# Patient Record
Sex: MALE | Race: WHITE | NOT HISPANIC OR LATINO | Employment: FULL TIME | URBAN - METROPOLITAN AREA
[De-identification: names, ages, dates, MRNs, and addresses within clinical notes are randomized per-mention and may not be internally consistent; named-entity substitution may affect disease eponyms.]

---

## 2017-07-26 ENCOUNTER — OFFICE VISIT - HEALTHEAST (OUTPATIENT)
Dept: FAMILY MEDICINE | Facility: CLINIC | Age: 22
End: 2017-07-26

## 2017-07-26 DIAGNOSIS — J02.0 STREP PHARYNGITIS: ICD-10-CM

## 2017-11-29 ENCOUNTER — RECORDS - HEALTHEAST (OUTPATIENT)
Dept: ADMINISTRATIVE | Facility: OTHER | Age: 22
End: 2017-11-29

## 2017-11-29 ENCOUNTER — OFFICE VISIT - HEALTHEAST (OUTPATIENT)
Dept: INTERNAL MEDICINE | Facility: CLINIC | Age: 22
End: 2017-11-29

## 2017-11-29 DIAGNOSIS — J98.4 MILD OBSTRUCTION OF PULMONARY AIRFLOW: ICD-10-CM

## 2017-11-29 DIAGNOSIS — R05.3 CHRONIC COUGH: ICD-10-CM

## 2017-11-29 ASSESSMENT — MIFFLIN-ST. JEOR: SCORE: 1882.77

## 2017-12-27 ENCOUNTER — OFFICE VISIT - HEALTHEAST (OUTPATIENT)
Dept: INTERNAL MEDICINE | Facility: CLINIC | Age: 22
End: 2017-12-27

## 2017-12-27 DIAGNOSIS — R05.3 CHRONIC COUGH: ICD-10-CM

## 2017-12-27 DIAGNOSIS — J45.30 MILD PERSISTENT ASTHMA, UNSPECIFIED WHETHER COMPLICATED: ICD-10-CM

## 2017-12-27 DIAGNOSIS — J98.4 MILD OBSTRUCTION OF PULMONARY AIRFLOW: ICD-10-CM

## 2018-01-27 ENCOUNTER — OFFICE VISIT - HEALTHEAST (OUTPATIENT)
Dept: FAMILY MEDICINE | Facility: CLINIC | Age: 23
End: 2018-01-27

## 2018-01-27 DIAGNOSIS — J02.0 STREP PHARYNGITIS: ICD-10-CM

## 2018-01-27 DIAGNOSIS — R30.0 DYSURIA: ICD-10-CM

## 2018-01-27 DIAGNOSIS — R07.0 THROAT PAIN: ICD-10-CM

## 2018-01-27 DIAGNOSIS — N39.0 UTI (URINARY TRACT INFECTION): ICD-10-CM

## 2018-01-27 LAB
ALBUMIN UR-MCNC: ABNORMAL MG/DL
APPEARANCE UR: CLEAR
BILIRUB UR QL STRIP: NEGATIVE
COLOR UR AUTO: YELLOW
DEPRECATED S PYO AG THROAT QL EIA: ABNORMAL
GLUCOSE UR STRIP-MCNC: NEGATIVE MG/DL
HGB UR QL STRIP: NEGATIVE
KETONES UR STRIP-MCNC: NEGATIVE MG/DL
LEUKOCYTE ESTERASE UR QL STRIP: NEGATIVE
NITRATE UR QL: NEGATIVE
PH UR STRIP: 6.5 [PH] (ref 5–8)
SP GR UR STRIP: >=1.03 (ref 1–1.03)
UROBILINOGEN UR STRIP-ACNC: ABNORMAL

## 2018-01-28 ENCOUNTER — COMMUNICATION - HEALTHEAST (OUTPATIENT)
Dept: FAMILY MEDICINE | Facility: CLINIC | Age: 23
End: 2018-01-28

## 2018-01-28 LAB — BACTERIA SPEC CULT: NO GROWTH

## 2018-03-14 ENCOUNTER — COMMUNICATION - HEALTHEAST (OUTPATIENT)
Dept: TELEHEALTH | Facility: CLINIC | Age: 23
End: 2018-03-14

## 2018-03-14 ENCOUNTER — OFFICE VISIT - HEALTHEAST (OUTPATIENT)
Dept: FAMILY MEDICINE | Facility: CLINIC | Age: 23
End: 2018-03-14

## 2018-03-14 DIAGNOSIS — R09.82 PND (POST-NASAL DRIP): ICD-10-CM

## 2018-03-14 DIAGNOSIS — R30.0 BURNING WITH URINATION: ICD-10-CM

## 2018-03-14 DIAGNOSIS — R10.31 RLQ ABDOMINAL PAIN: ICD-10-CM

## 2018-03-14 LAB
ALBUMIN SERPL-MCNC: 4 G/DL (ref 3.5–5)
ALBUMIN UR-MCNC: NEGATIVE MG/DL
ALP SERPL-CCNC: 51 U/L (ref 45–120)
ALT SERPL W P-5'-P-CCNC: 65 U/L (ref 0–45)
ANION GAP SERPL CALCULATED.3IONS-SCNC: 9 MMOL/L (ref 5–18)
APPEARANCE UR: CLEAR
AST SERPL W P-5'-P-CCNC: 29 U/L (ref 0–40)
BACTERIA #/AREA URNS HPF: ABNORMAL HPF
BASOPHILS # BLD AUTO: 0 THOU/UL (ref 0–0.2)
BASOPHILS NFR BLD AUTO: 1 % (ref 0–2)
BILIRUB SERPL-MCNC: 0.6 MG/DL (ref 0–1)
BILIRUB UR QL STRIP: NEGATIVE
BUN SERPL-MCNC: 17 MG/DL (ref 8–22)
CALCIUM SERPL-MCNC: 9.6 MG/DL (ref 8.5–10.5)
CHLORIDE BLD-SCNC: 102 MMOL/L (ref 98–107)
CO2 SERPL-SCNC: 28 MMOL/L (ref 22–31)
COLOR UR AUTO: YELLOW
CREAT SERPL-MCNC: 0.86 MG/DL (ref 0.7–1.3)
EOSINOPHIL # BLD AUTO: 0.3 THOU/UL (ref 0–0.4)
EOSINOPHIL NFR BLD AUTO: 8 % (ref 0–6)
ERYTHROCYTE [DISTWIDTH] IN BLOOD BY AUTOMATED COUNT: 11.1 % (ref 11–14.5)
GFR SERPL CREATININE-BSD FRML MDRD: >60 ML/MIN/1.73M2
GLUCOSE BLD-MCNC: 106 MG/DL (ref 70–125)
GLUCOSE UR STRIP-MCNC: NEGATIVE MG/DL
HCT VFR BLD AUTO: 41.2 % (ref 40–54)
HGB BLD-MCNC: 14.3 G/DL (ref 14–18)
HGB UR QL STRIP: NEGATIVE
KETONES UR STRIP-MCNC: NEGATIVE MG/DL
LEUKOCYTE ESTERASE UR QL STRIP: ABNORMAL
LYMPHOCYTES # BLD AUTO: 1.4 THOU/UL (ref 0.8–4.4)
LYMPHOCYTES NFR BLD AUTO: 39 % (ref 20–40)
MCH RBC QN AUTO: 31.5 PG (ref 27–34)
MCHC RBC AUTO-ENTMCNC: 34.8 G/DL (ref 32–36)
MCV RBC AUTO: 91 FL (ref 80–100)
MONOCYTES # BLD AUTO: 0.3 THOU/UL (ref 0–0.9)
MONOCYTES NFR BLD AUTO: 8 % (ref 2–10)
NEUTROPHILS # BLD AUTO: 1.6 THOU/UL (ref 2–7.7)
NEUTROPHILS NFR BLD AUTO: 45 % (ref 50–70)
NITRATE UR QL: NEGATIVE
PH UR STRIP: 6.5 [PH] (ref 5–8)
PLATELET # BLD AUTO: 196 THOU/UL (ref 140–440)
PMV BLD AUTO: 6.7 FL (ref 7–10)
POTASSIUM BLD-SCNC: 4.2 MMOL/L (ref 3.5–5)
PROT SERPL-MCNC: 7 G/DL (ref 6–8)
RBC # BLD AUTO: 4.55 MILL/UL (ref 4.4–6.2)
RBC #/AREA URNS AUTO: ABNORMAL HPF
SODIUM SERPL-SCNC: 139 MMOL/L (ref 136–145)
SP GR UR STRIP: 1.02 (ref 1–1.03)
SQUAMOUS #/AREA URNS AUTO: ABNORMAL LPF
UROBILINOGEN UR STRIP-ACNC: ABNORMAL
WBC #/AREA URNS AUTO: ABNORMAL HPF
WBC: 3.6 THOU/UL (ref 4–11)

## 2018-03-15 LAB — BACTERIA SPEC CULT: NO GROWTH

## 2018-03-19 ENCOUNTER — COMMUNICATION - HEALTHEAST (OUTPATIENT)
Dept: FAMILY MEDICINE | Facility: CLINIC | Age: 23
End: 2018-03-19

## 2018-03-19 ENCOUNTER — AMBULATORY - HEALTHEAST (OUTPATIENT)
Dept: FAMILY MEDICINE | Facility: CLINIC | Age: 23
End: 2018-03-19

## 2018-03-19 DIAGNOSIS — D72.819 LEUKOPENIA: ICD-10-CM

## 2018-04-02 ENCOUNTER — OFFICE VISIT - HEALTHEAST (OUTPATIENT)
Dept: OTOLARYNGOLOGY | Facility: CLINIC | Age: 23
End: 2018-04-02

## 2018-04-02 DIAGNOSIS — J31.0 CHRONIC RHINITIS, UNSPECIFIED TYPE: ICD-10-CM

## 2018-04-19 ENCOUNTER — OFFICE VISIT - HEALTHEAST (OUTPATIENT)
Dept: ALLERGY | Facility: CLINIC | Age: 23
End: 2018-04-19

## 2018-04-19 DIAGNOSIS — R09.81 NASAL CONGESTION: ICD-10-CM

## 2018-04-19 ASSESSMENT — MIFFLIN-ST. JEOR: SCORE: 1973.49

## 2018-12-05 ENCOUNTER — OFFICE VISIT - HEALTHEAST (OUTPATIENT)
Dept: FAMILY MEDICINE | Facility: CLINIC | Age: 23
End: 2018-12-05

## 2018-12-05 DIAGNOSIS — J06.9 VIRAL UPPER RESPIRATORY TRACT INFECTION: ICD-10-CM

## 2018-12-05 DIAGNOSIS — R19.7 DIARRHEA, UNSPECIFIED TYPE: ICD-10-CM

## 2018-12-05 DIAGNOSIS — R07.0 THROAT PAIN: ICD-10-CM

## 2018-12-05 LAB — DEPRECATED S PYO AG THROAT QL EIA: NORMAL

## 2018-12-06 LAB — GROUP A STREP BY PCR: NORMAL

## 2019-11-15 ENCOUNTER — OFFICE VISIT - HEALTHEAST (OUTPATIENT)
Dept: INTERNAL MEDICINE | Facility: CLINIC | Age: 24
End: 2019-11-15

## 2019-11-15 DIAGNOSIS — Z11.59 NEED FOR HEPATITIS C SCREENING TEST: ICD-10-CM

## 2019-11-15 DIAGNOSIS — R74.01 TRANSAMINITIS: ICD-10-CM

## 2019-11-15 DIAGNOSIS — R30.0 DYSURIA: ICD-10-CM

## 2019-11-15 DIAGNOSIS — Z11.4 SCREENING FOR HIV (HUMAN IMMUNODEFICIENCY VIRUS): ICD-10-CM

## 2019-11-15 DIAGNOSIS — Z13.220 SCREENING FOR HYPERLIPIDEMIA: ICD-10-CM

## 2019-11-15 DIAGNOSIS — Z00.00 ROUTINE GENERAL MEDICAL EXAMINATION AT A HEALTH CARE FACILITY: ICD-10-CM

## 2019-11-15 DIAGNOSIS — Z11.3 SCREEN FOR STD (SEXUALLY TRANSMITTED DISEASE): ICD-10-CM

## 2019-11-15 DIAGNOSIS — Z13.1 SCREENING FOR DIABETES MELLITUS: ICD-10-CM

## 2019-11-15 LAB
ALBUMIN SERPL-MCNC: 4.3 G/DL (ref 3.5–5)
ALBUMIN UR-MCNC: NEGATIVE MG/DL
ALP SERPL-CCNC: 56 U/L (ref 45–120)
ALT SERPL W P-5'-P-CCNC: 86 U/L (ref 0–45)
ANION GAP SERPL CALCULATED.3IONS-SCNC: 9 MMOL/L (ref 5–18)
APPEARANCE UR: CLEAR
AST SERPL W P-5'-P-CCNC: 109 U/L (ref 0–40)
BACTERIA #/AREA URNS HPF: ABNORMAL HPF
BILIRUB SERPL-MCNC: 0.4 MG/DL (ref 0–1)
BILIRUB UR QL STRIP: NEGATIVE
BUN SERPL-MCNC: 16 MG/DL (ref 8–22)
CALCIUM SERPL-MCNC: 9.7 MG/DL (ref 8.5–10.5)
CHLORIDE BLD-SCNC: 104 MMOL/L (ref 98–107)
CHOLEST SERPL-MCNC: 170 MG/DL
CO2 SERPL-SCNC: 27 MMOL/L (ref 22–31)
COLOR UR AUTO: YELLOW
CREAT SERPL-MCNC: 0.9 MG/DL (ref 0.7–1.3)
ERYTHROCYTE [DISTWIDTH] IN BLOOD BY AUTOMATED COUNT: 11.6 % (ref 11–14.5)
FASTING STATUS PATIENT QL REPORTED: YES
GFR SERPL CREATININE-BSD FRML MDRD: >60 ML/MIN/1.73M2
GLUCOSE BLD-MCNC: 86 MG/DL (ref 70–125)
GLUCOSE UR STRIP-MCNC: NEGATIVE MG/DL
HBA1C MFR BLD: 5.2 % (ref 3.5–6)
HCT VFR BLD AUTO: 42.6 % (ref 40–54)
HDLC SERPL-MCNC: 43 MG/DL
HGB BLD-MCNC: 14.4 G/DL (ref 14–18)
HGB UR QL STRIP: ABNORMAL
HIV 1+2 AB+HIV1 P24 AG SERPL QL IA: NEGATIVE
KETONES UR STRIP-MCNC: NEGATIVE MG/DL
LDLC SERPL CALC-MCNC: 102 MG/DL
LEUKOCYTE ESTERASE UR QL STRIP: NEGATIVE
MCH RBC QN AUTO: 30.7 PG (ref 27–34)
MCHC RBC AUTO-ENTMCNC: 33.8 G/DL (ref 32–36)
MCV RBC AUTO: 91 FL (ref 80–100)
NITRATE UR QL: NEGATIVE
PH UR STRIP: 6.5 [PH] (ref 5–8)
PLATELET # BLD AUTO: 233 THOU/UL (ref 140–440)
PMV BLD AUTO: 7.2 FL (ref 7–10)
POTASSIUM BLD-SCNC: 4.4 MMOL/L (ref 3.5–5)
PROT SERPL-MCNC: 7.4 G/DL (ref 6–8)
RBC # BLD AUTO: 4.69 MILL/UL (ref 4.4–6.2)
RBC #/AREA URNS AUTO: ABNORMAL HPF
SODIUM SERPL-SCNC: 140 MMOL/L (ref 136–145)
SP GR UR STRIP: 1.01 (ref 1–1.03)
SQUAMOUS #/AREA URNS AUTO: ABNORMAL LPF
TRIGL SERPL-MCNC: 127 MG/DL
UROBILINOGEN UR STRIP-ACNC: ABNORMAL
WBC #/AREA URNS AUTO: ABNORMAL HPF
WBC: 3.4 THOU/UL (ref 4–11)

## 2019-11-15 RX ORDER — CETIRIZINE HYDROCHLORIDE 10 MG/1
10 TABLET ORAL DAILY
Status: SHIPPED | COMMUNITY
Start: 2019-11-15

## 2019-11-15 ASSESSMENT — MIFFLIN-ST. JEOR: SCORE: 1909.99

## 2019-11-18 ENCOUNTER — COMMUNICATION - HEALTHEAST (OUTPATIENT)
Dept: LAB | Facility: CLINIC | Age: 24
End: 2019-11-18

## 2019-11-18 ENCOUNTER — COMMUNICATION - HEALTHEAST (OUTPATIENT)
Dept: INTERNAL MEDICINE | Facility: CLINIC | Age: 24
End: 2019-11-18

## 2019-11-18 DIAGNOSIS — Z00.00 ROUTINE GENERAL MEDICAL EXAMINATION AT A HEALTH CARE FACILITY: ICD-10-CM

## 2019-11-18 LAB
C TRACH DNA SPEC QL PROBE+SIG AMP: POSITIVE
HCV AB SERPL QL IA: NEGATIVE
N GONORRHOEA DNA SPEC QL NAA+PROBE: NEGATIVE

## 2019-11-26 ENCOUNTER — OFFICE VISIT - HEALTHEAST (OUTPATIENT)
Dept: INTERNAL MEDICINE | Facility: CLINIC | Age: 24
End: 2019-11-26

## 2019-11-26 DIAGNOSIS — A74.9 CHLAMYDIA INFECTION: ICD-10-CM

## 2019-11-26 DIAGNOSIS — Z78.9 ALCOHOL USE: ICD-10-CM

## 2019-11-26 DIAGNOSIS — R74.01 TRANSAMINITIS: ICD-10-CM

## 2019-11-26 ASSESSMENT — MIFFLIN-ST. JEOR: SCORE: 1905.46

## 2021-05-24 ENCOUNTER — RECORDS - HEALTHEAST (OUTPATIENT)
Dept: ADMINISTRATIVE | Facility: CLINIC | Age: 26
End: 2021-05-24

## 2021-05-25 ENCOUNTER — RECORDS - HEALTHEAST (OUTPATIENT)
Dept: ADMINISTRATIVE | Facility: CLINIC | Age: 26
End: 2021-05-25

## 2021-05-28 ASSESSMENT — ASTHMA QUESTIONNAIRES: ACT_TOTALSCORE: 25

## 2021-05-31 VITALS — BODY MASS INDEX: 27.02 KG/M2 | WEIGHT: 202 LBS

## 2021-05-31 VITALS — WEIGHT: 193.3 LBS | BODY MASS INDEX: 25.86 KG/M2

## 2021-05-31 VITALS — WEIGHT: 190 LBS | HEIGHT: 73 IN | BODY MASS INDEX: 25.18 KG/M2

## 2021-05-31 VITALS — BODY MASS INDEX: 25.44 KG/M2 | WEIGHT: 190.2 LBS

## 2021-06-01 VITALS — WEIGHT: 210 LBS | HEIGHT: 73 IN | BODY MASS INDEX: 27.83 KG/M2

## 2021-06-01 VITALS — WEIGHT: 206 LBS | BODY MASS INDEX: 27.55 KG/M2

## 2021-06-02 VITALS — WEIGHT: 204 LBS | BODY MASS INDEX: 27.29 KG/M2

## 2021-06-03 NOTE — PROGRESS NOTES
"  Office Visit - Follow Up   Agustín Conway   23 y.o. male    Date of Visit: 11/26/2019    Chief Complaint   Patient presents with     Lab Result Follow Up        Assessment and Plan   1. Chlamydia infection  Treated with azithromycin, now asymptomatic.  We discussed modes of transmission.  We discussed partners and alerting partners.  No recent sexual contacts.  Prior girlfriend, no contact for 6 months, she has had repeated negative chlamydial testing    2. Transaminitis  Likely related to alcohol.  I recommended that he limit alcohol to 4 5 drinks per week that is 100 g of alcohol per week and come back in the next 1 to 3 months for recheck of labs    3. Alcohol use  See above, if unable to cut back consider treatment options    Return in about 3 months (around 2/26/2020) for recheck.     History of Present Illness   This 23 y.o. old man comes in for follow-up.  Last visit he was complaining of some urethritis.  Positive for chlamydia.  Improved with azithromycin.  Comes in to discuss this.  Also had elevated liver test.  Reports that he had a lot of alcohol 2 nights prior to testing.  He frequently will have excessive alcohol.  He would like to cut back.  He thinks he is capable of doing this.    Review of Systems: A comprehensive review of systems was negative except as noted.     Medications, Allergies and Problem List   Reviewed, reconciled and updated  Post Discharge Medication Reconciliation Status:      Physical Exam   General Appearance:   No acute distress    /66 (Patient Site: Left Arm, Patient Position: Sitting, Cuff Size: Adult Regular)   Pulse 63   Ht 5' 10.5\" (1.791 m)   Wt 202 lb (91.6 kg)   SpO2 96%   BMI 28.57 kg/m        Cardiovascular regular rate and rhythm no murmur gallop or rub  Pulmonary lungs are clear to auscultation bilaterally  Gastrointestinal abdomen soft nontender nondistended no organomegaly  Neurologic exam is non focal  Psychiatric pleasant, no confusion or agitation    "     Additional Information   Current Outpatient Medications   Medication Sig Dispense Refill     cetirizine (ZYRTEC) 10 MG tablet Take 10 mg by mouth daily.       No current facility-administered medications for this visit.      No Known Allergies  Social History     Tobacco Use     Smoking status: Former Smoker     Smokeless tobacco: Former User   Substance Use Topics     Alcohol use: Yes     Alcohol/week: 3.0 standard drinks     Types: 3 Standard drinks or equivalent per week     Drug use: No       Review and/or order of clinical lab tests:  Review and/or order of radiology tests:  Review and/or order of medicine tests:  Discussion of test results with performing physician:  Decision to obtain old records and/or obtain history from someone other than the patient:  Review and summarization of old records and/or obtaining history from someone other than the patient and.or discussion of case with another health care provider:  Independent visualization of image, tracing or specimen itself:    Time:      Dick Flynn MD

## 2021-06-03 NOTE — PROGRESS NOTES
Office Visit - Physical   Agustín Conway   23 y.o.  male    Date of visit: 11/15/2019  Physician: Dick Flynn MD     Assessment and Plan   1. Routine general medical examination at a health care facility  Is a 23-year-old man with issues as discussed below.  Ongoing healthy lifestyle discussed and recommended    2. Screening for hyperlipidemia  - Lipid Cascade; Future  - Lipid Cascade    3. Screening for diabetes mellitus  - Glycosylated Hemoglobin A1c    4. Need for hepatitis C screening test  - Hepatitis C Antibody (Anti-HCV)    5. Screen for STD (sexually transmitted disease)  - Chlamydia trachomatis & Neisseria gonorrhoeae, Amplified Detection    6. Screening for HIV (human immunodeficiency virus)  - HIV Antigen/Antibody Screening Cascade    7. Dysuria  Symptoms would be suggestive of something like chlamydia.  Therefore empirically given him azithromycin 1 g.  Also check for gonorrhea.  Consider urology evaluation if testing unremarkable and symptoms persist  - Urinalysis-UC if Indicated  - azithromycin (ZITHROMAX) 500 MG tablet; Take 2 tablets (1,000 mg total) by mouth once for 1 dose.  Dispense: 2 tablet; Refill: 0    8. Transaminitis  Etiology uncertain.  Checking labs as above.  Additionally have stressed importance of low carbohydrate diet and minimization of alcohol.  - Comprehensive Metabolic Panel  - HM2(CBC w/o Differential)    Return in about 1 year (around 11/15/2020) for annual physical.     Chief Complaint   Chief Complaint   Patient presents with     Annual Exam        Patient Profile   Social History     Social History Narrative    Lives alone.  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          Past Medical History   Patient Active Problem List   Diagnosis     Seasonal allergic rhinitis       Past Surgical History  He has a past surgical history that includes Ankle surgery (Right); ORIF wrist fracture; and ORIF elbow fracture (Left).     History of Present Illness   This 23 y.o. old man  "comes in for annual physical as well as evaluation of a few concerns.  Overall fairly healthy.  Working for [employer].  Has a history of some dysuria.  He has been treated intermittently for possible urinary tract infections and has had some exposures to STDs and has been treated empirically for 6 or 7 infections.  It does not look like he is ever had any positive testing.  Over the last few weeks to months he is been having some pain with urination.  [place] when he urinates.  He has not noticed any blood or pus.  No swelling of the testicles or pain.  No fever chills.  No new sexual partners.  Last new sexual partner in June.  Had been using condoms.  Has female partners.  No history of male partners.  No history of intravenous drug use.  Previously had mild elevation in liver test.  Does endorse heavy alcohol use in the past currently states not drinking heavily.    Review of Systems: A comprehensive review of systems was negative except as noted.     Medications and Allergies   Current Outpatient Medications   Medication Sig Dispense Refill     cetirizine (ZYRTEC) 10 MG tablet Take 10 mg by mouth daily.       azithromycin (ZITHROMAX) 500 MG tablet Take 2 tablets (1,000 mg total) by mouth once for 1 dose. 2 tablet 0     No current facility-administered medications for this visit.      No Known Allergies     Family and Social History   Family History   Problem Relation Age of Onset     No Medical Problems Mother      No Medical Problems Father      Drug abuse Brother         Social History     Tobacco Use     Smoking status: Former Smoker     Smokeless tobacco: Former User   Substance Use Topics     Alcohol use: Yes     Alcohol/week: 3.0 standard drinks     Types: 3 Standard drinks or equivalent per week     Drug use: No        Physical Exam   General Appearance:   No acute distress    /62 (Patient Site: Right Arm, Patient Position: Sitting, Cuff Size: Adult Regular)   Pulse 90   Ht 5' 10.5\" (1.791 m) "   Wt 203 lb (92.1 kg)   SpO2 95%   BMI 28.72 kg/m      EYES: Eyelids, conjunctiva, and sclera were normal. Pupils were normal. Cornea, iris, and lens were normal bilaterally.  HEAD, EARS, NOSE, MOUTH, AND THROAT: Head and face were normal. Hearing was normal to voice and the ears were normal to external exam. Nose appearance was normal and there was no discharge. Oropharynx was normal.  NECK: Neck appearance was normal. There were no neck masses and the thyroid was not enlarged.  RESPIRATORY: Breathing pattern was normal and the chest moved symmetrically.  Percussion/auscultatory percussion was normal.  Lung sounds were normal and there were no abnormal sounds.  CARDIOVASCULAR: Heart rate and rhythm were normal.  S1 and S2 were normal and there were no extra sounds or murmurs. Peripheral pulses in arms and legs were normal.  Jugular venous pressure was normal.  There was no peripheral edema.  GASTROINTESTINAL: The abdomen was normal in contour.  Bowel sounds were present.  Percussion detected no organ enlargement or tenderness.  Palpation detected no tenderness, mass, or enlarged organs.   MUSCULOSKELETAL: Skeletal configuration was normal and muscle mass was normal for age. Joint appearance was overall normal.  LYMPHATIC: There were no enlarged nodes.  SKIN/HAIR/NAILS: Skin color was normal.  There were no skin lesions.  Hair and nails were normal.  NEUROLOGIC: The patient was alert and oriented to person, place, time, and circumstance. Speech was normal. Cranial nerves were normal. Motor strength was normal for age. The patient was normally coordinated.  PSYCHIATRIC:  Mood and affect were normal and the patient had normal recent and remote memory. The patient's judgment and insight were normal.       Additional Information        Dick Flynn MD  Internal Medicine  Contact me at [phone]

## 2021-06-04 VITALS
OXYGEN SATURATION: 95 % | HEIGHT: 71 IN | DIASTOLIC BLOOD PRESSURE: 62 MMHG | WEIGHT: 203 LBS | BODY MASS INDEX: 28.42 KG/M2 | SYSTOLIC BLOOD PRESSURE: 126 MMHG | HEART RATE: 90 BPM

## 2021-06-04 VITALS
HEIGHT: 71 IN | BODY MASS INDEX: 28.28 KG/M2 | SYSTOLIC BLOOD PRESSURE: 120 MMHG | WEIGHT: 202 LBS | HEART RATE: 63 BPM | OXYGEN SATURATION: 96 % | DIASTOLIC BLOOD PRESSURE: 66 MMHG

## 2021-06-12 NOTE — PROGRESS NOTES
SUBJECTIVE:   Agustín Conway is a 21 y.o. male patient comes in for evaluation of a sore throat for the last several days.  His girlfriend had strep 2 weeks ago and was treated, but now has a sore throat.  He has also had a intermittent cough for the last 2 months that slightly productive.  He has had a little bit of wheezing.  He has had an albuterol MDI in the past and would like a refill.    OBJECTIVE:   Appears mildly ill   Ears: normal  Eyes: no redness or discharge  Nose: no discharge  Oropharynx: moderate erythema  Neck: no adenopathy  Lungs: clear to auscultation, no wheezes or rales and unlabored breathing  Skin: no strep-like sandpaper rash.    Rapid Strep test is positive    ASSESSMENT: Streptococcal pharyngitis and slight cough for the last couple months. He says that he wheezes at times and lungs sounded clear this visit. I will refill the albuterol.    PLAN:     Medications Ordered   Medications     albuterol (PROAIR HFA;PROVENTIL HFA;VENTOLIN HFA) 90 mcg/actuation inhaler     Sig: Inhale 2 puffs every 6 (six) hours as needed for wheezing.     Dispense:  1 each     Refill:  1     May substitute the equivalent medication per insurance preference.     amoxicillin (AMOXIL) 875 MG tablet     Sig: Take 1 tablet (875 mg total) by mouth 2 (two) times a day for 10 days.     Dispense:  20 tablet     Refill:  0

## 2021-06-14 NOTE — PROGRESS NOTES
"[facility] Clinic Note  Patient Name: Agustín Conway  Patient Age: 21 y.o.  YOB: 1995  MRN: [id number]  ?  Date of Visit: 11/29/2017  Reason for Office Visit:   Chief Complaint   Patient presents with     Cough     x 6 months     Illness     cold     HPI: Agustín Conway 21 y.o. male who presents to clinic for cough x 6 mo, intermittent, productive, clear sputum. Recently has had a cold, with post nasal drip that has made it worse. Slight wheezing. He worked out the other day and had some wheezing post work out. No history of reflux. Previous smoker in college. Does not currently smoke. He is constantly having to clear throat. No chest pain, sob, monzon, fevers/chills, weight loss.     Review of Systems: As noted in HPI     Current Scheduled Meds:  Outpatient Encounter Prescriptions as of 11/29/2017   Medication Sig Dispense Refill     albuterol (PROAIR HFA;PROVENTIL HFA;VENTOLIN HFA) 90 mcg/actuation inhaler Inhale 2 puffs every 6 (six) hours as needed for wheezing. 1 each 1     fluticasone (FLONASE) 50 mcg/actuation nasal spray 1 spray into each nostril daily. 16 g 2     fluticasone (FLOVENT HFA) 110 mcg/actuation inhaler Inhale 1 puff 2 (two) times a day. 1 Inhaler 2     No facility-administered encounter medications on file as of 11/29/2017.        Objective / Physical Examination:  /68  Pulse 71  Temp 98.4  F (36.9  C) (Oral)   Ht 6' 0.5\" (1.842 m)  Wt 190 lb (86.2 kg)  SpO2 100%  BMI 25.41 kg/m2  Wt Readings from Last 3 Encounters:   11/29/17 190 lb (86.2 kg)   07/26/17 190 lb 3.2 oz (86.3 kg)   11/07/16 191 lb (86.6 kg)     Body mass index is 25.41 kg/(m^2). (>25?)    General Appearance: Alert and oriented in no acute distress  Ears: Tympanic membrane clear with landmarks well visualized bilaterally  Eyes: Conjunctivae clear   Nose: Septum midline, nares patent, mucosa moist and without drainage  Throat: Lips and mucosa moist. +cobblestoning, pharynx without erythema or " exudate  Neck: No cervical adenopathy.  Lungs: Clear to auscultation bilaterally. Normal inspiratory and expiratory effort. No w/r/r  Cardiovascular: RRR  Integumentary: Warm and dry  Neuro: Alert and oriented, follows commands appropriately    Assessment / Plan / Medical Decision Making:      Encounter Diagnoses   Name Primary?     Chronic cough Yes     Mild obstruction of pulmonary airflow         1. Chronic cough  2. Mild obstruction of pulmonary airflow    6 mo chronic cough. Suspect reactive airway in the setting of postal drip. Spirometry today in clinic showed mild obstruction. Will start on qvar daily and a fluticasone nasal spray. Educated on medication side effects, rinsing mouth.     - Spirometry without bronchodilator  - QVAR 40 mcg 1 puff bid  - fluticasone (FLONASE) 50 mcg/actuation nasal spray; 1 spray into each nostril daily.  Dispense: 16 g; Refill: 2    Follow up in 4 weeks to reassess symptoms.     Total time spent with patient was 15 minutes with >50% of time spent in face-to-face counseling regarding the above plan     José iMguel Jenkins MD  [facility]

## 2021-06-15 NOTE — PROGRESS NOTES
[facility] Clinic Note  Patient Name: Agustín Conway  Patient Age: 22 y.o.  YOB: 1995  MRN: [id number]  ?  Date of Visit: 12/27/2017  Reason for Office Visit:   Chief Complaint   Patient presents with     Follow-up     cough        HPI: Agustín Conway 22 y.o. who presents to clinic for follow up. He was seen a month ago for a chronic cough. We did a spirometry and showed mild obstruction. He was given albuterol inhaler in the past for a reactive airway disease. We decided to try a daily controller, Qvar and Flonase for his post nasal drip. He says his breathing has definitely improved but still feels 'something in the back of his throat'. Feels like mucus he says. He does report reflux at times yaritza at night. Sometimes has wheezing at night. When he works out he has some wheezing and SOB still and with cold weather. He is out of the albuterol inhaler     Review of Systems: As noted in HPI     Current Scheduled Meds:  Outpatient Encounter Prescriptions as of 12/27/2017   Medication Sig Dispense Refill     albuterol (PROAIR HFA;PROVENTIL HFA;VENTOLIN HFA) 90 mcg/actuation inhaler Inhale 2 puffs every 6 (six) hours as needed for wheezing. 1 each 1     beclomethasone (QVAR) 40 mcg/actuation inhaler Inhale 1 puff 2 (two) times a day. 1 Inhaler 12     fluticasone (FLONASE) 50 mcg/actuation nasal spray 1 spray into each nostril daily. 16 g 2     [DISCONTINUED] albuterol (PROAIR HFA;PROVENTIL HFA;VENTOLIN HFA) 90 mcg/actuation inhaler Inhale 2 puffs every 6 (six) hours as needed for wheezing. 1 each 1     [DISCONTINUED] beclomethasone (QVAR) 40 mcg/actuation inhaler Inhale 1 puff 2 (two) times a day. 1 Inhaler 12     omeprazole (PRILOSEC) 20 MG capsule Take 1 capsule (20 mg total) by mouth daily before breakfast. 30 capsule 0     No facility-administered encounter medications on file as of 12/27/2017.        Objective / Physical Examination:  /62  Pulse (!) 59  Wt 193 lb 4.8 oz (87.7 kg)  SpO2  99%  BMI 25.86 kg/m2  Wt Readings from Last 3 Encounters:   12/27/17 193 lb 4.8 oz (87.7 kg)   11/29/17 190 lb (86.2 kg)   07/26/17 190 lb 3.2 oz (86.3 kg)     Body mass index is 25.86 kg/(m^2). (>25?)    General Appearance: Alert and oriented in no acute distress  Ears: Tympanic membrane clear with landmarks well visualized bilaterally  Eyes: Conjunctivae clear   Nose: Septum midline, nares patent, mucosa moist and without drainage  Throat: Lips and mucosa moist. pharynx without erythema or exudate  Neck: Supple, trachea midline. No cervical adenopathy.   Lungs: Clear to auscultation bilaterally. Normal inspiratory and expiratory effort. No w/r/r  Cardiovascular: RRR   Abdomen: Soft, non-tender.  Extremities: No edema.  Integumentary: Warm and dry.  Neuro: Alert and oriented, follows commands appropriately.     Assessment / Plan / Medical Decision Making:      Encounter Diagnoses   Name Primary?     Chronic cough      Mild obstruction of pulmonary airflow      Mild persistent asthma, unspecified whether complicated         1. Chronic cough  2. Mild obstruction of pulmonary airflow    Will add omeprazole 20 mg daily to see if this helps as I suspect some component of reflux may be contributing, and continue with daily controller. Educated on lifestyle, heavy meals, etoh, etc. Lungs CTA. Refilled albuterol to be used if needed, working out, cold weather et    If symptoms are not improving over the next month would consider ENT referral    - albuterol (PROAIR HFA;PROVENTIL HFA;VENTOLIN HFA) 90 mcg/actuation inhaler; Inhale 2 puffs every 6 (six) hours as needed for wheezing.  Dispense: 1 each; Refill: 1  - beclomethasone (QVAR) 40 mcg/actuation inhaler; Inhale 1 puff 2 (two) times a day.  Dispense: 1 Inhaler; Refill: 12  - omeprazole (PRILOSEC) 20 MG capsule; Take 1 capsule (20 mg total) by mouth daily before breakfast.  Dispense: 30 capsule; Refill: 0    Total time spent with patient was 15 minutes with >50% of time  spent in face-to-face counseling regarding the above plan     José Miguel Jenkins MD  [facility]

## 2021-06-15 NOTE — PROGRESS NOTES
Chief Complaint   Patient presents with     Sore Throat     Sore throat x 3 days      Dysuria     Poss bladder infection         HPI     Agustín Conway is a 22 y.o. male seen today for dysuria for 5 days.  Acknowledges mild suprapubic tenderness.  Denies fevers, chills, nausea, back or flank pain.  Denies penile discharge.  He had one similar episode approximately a year ago that he ignored for 2 weeks prior to seeking care, and it sounds that episode may have progressed to pyelonephritis.  He is confident that is not an STD exposure.  Additionally he notes he had a sore throat for the last 3 days.  Denies cough or URI symptoms denies nausea, vomiting, abdominal pain.    Current Outpatient Prescriptions   Medication Sig Dispense Refill     ibuprofen (ADVIL,MOTRIN) 200 MG tablet Take 200 mg by mouth every 6 (six) hours as needed for pain.       albuterol (PROAIR HFA;PROVENTIL HFA;VENTOLIN HFA) 90 mcg/actuation inhaler Inhale 2 puffs every 6 (six) hours as needed for wheezing. 1 each 1     beclomethasone (QVAR) 40 mcg/actuation inhaler Inhale 1 puff 2 (two) times a day. 1 Inhaler 12     cefdinir (OMNICEF) 300 MG capsule Take 1 capsule (300 mg total) by mouth 2 (two) times a day for 10 days. 20 capsule 0     fluticasone (FLONASE) 50 mcg/actuation nasal spray 1 spray into each nostril daily. 16 g 2     omeprazole (PRILOSEC) 20 MG capsule Take 1 capsule (20 mg total) by mouth daily before breakfast. 30 capsule 0     No current facility-administered medications for this visit.         Reviewed and updated: medical history, medications and allergies.     Review of Systems     General: Denies fever, chills, fatigue.  ENT: Sore throat as noted in HPI.  Cardiovascular: Denies chest pain, dyspnea on exertion, palpitations.  Respiratory: Denies dyspnea, cough, wheezing.  GI: Denies nausea, vomiting, diarrhea, constipation.  : Acknowledges dysuria and urgency.  Denies polyuria.     Objective     Vitals:    01/27/18 1530   BP:  120/70   Patient Site: Right Arm   Patient Position: Sitting   Cuff Size: Adult Regular   Pulse: 74   Temp: 98.1  F (36.7  C)   TempSrc: Oral   SpO2: 99%   Weight: 202 lb (91.6 kg)        Reviewed vital signs.  General: Appears calm, comfortable. Answers questions quickly and appropriately with clear speech. No apparent distress.  Skin: Pink, warm, dry.  HENT: Normocephalic, atraumatic.  Oral mucosa moist without lesion or exudate.  Posterior oropharynx is not erythematous and tonsils are 1+ bilaterally and without exudate.  Uvula rises to midline.    Neck: Supple, without lymphadenitis.  Heart: Strong, regular radial pulse.  Lungs: Normal respiratory effort.  Neuro: Memory and cognition appear normal. Normal gait.  Psych: Mood and affect appear normal.   Abdomen: Mild suprapubic tenderness.    Results for orders placed or performed in visit on 01/27/18   Rapid Strep A Screen-Throat   Result Value Ref Range    Rapid Strep A Antigen Group A Strep detected (!) No Group A Strep detected, presumptive negative   Urinalysis-UC if Indicated   Result Value Ref Range    Color, UA Yellow Colorless, Yellow, Straw, Light Yellow    Clarity, UA Clear Clear    Glucose, UA Negative Negative    Bilirubin, UA Negative Negative    Ketones, UA Negative Negative    Specific Gravity, UA >=1.030 1.005 - 1.030    Blood, UA Negative Negative    pH, UA 6.5 5.0 - 8.0    Protein, UA Trace (!) Negative mg/dL    Urobilinogen, UA 0.2 E.U./dL 0.2 E.U./dL, 1.0 E.U./dL    Nitrite, UA Negative Negative    Leukocytes, UA Negative Negative          Medical Decision-Making     Agustín is a healthy-appearing 22-year-old male who since with a sore throat and dysuria.  Clinically the dysuria symptoms appear consistent with a UTI including burning with urination and suprapubic discomfort and tenderness.  UA does not support this, however I will choose to treat empirically regardless given the clear clinical picture.  As this is his second episode, strongly  counseled him to establish with a PCP for further evaluation as UTI is uncommon in males.  Additionally is concerned for sore throat, which did not appear impressive on clinical exam however did test positive for group A strep.  Elected to go with Cefdinir as it should be effective against both group A strep and common UTI organisms.    Reviewed red flags that would trigger a prompt return to the clinic as noted below under patient instructions.  He expressed understanding of these directions and is in agreement with the plan.     Assessment and Plan     Agustín was seen today for sore throat and dysuria.    Diagnoses and all orders for this visit:    Dysuria  -     Urinalysis-UC if Indicated  -     Culture, Urine    Throat pain  -     Rapid Strep A Screen-Throat    UTI (urinary tract infection)  -     cefdinir (OMNICEF) 300 MG capsule; Take 1 capsule (300 mg total) by mouth 2 (two) times a day for 10 days.    Strep pharyngitis  -     cefdinir (OMNICEF) 300 MG capsule; Take 1 capsule (300 mg total) by mouth 2 (two) times a day for 10 days.    Other orders  -     Cancel: nitrofurantoin, macrocrystal-monohydrate, (MACROBID) 100 MG capsule; Take 1 capsule (100 mg total) by mouth 2 (two) times a day for 7 days.        Patient Instructions   Establish care with a new primary care provider.    If the urine culture also is negative, talk to your new doctor about further testing.    Please return to the clinic if you notice any of the following:    Fever, chills, nausea.    Symptoms don't resolve in 2 - 3 days.    Symptoms that are getting worse instead of better.        Discussed benefit vs risk of medications, dosing, side effects.  Patient was able to verbalize understanding.  After visit summary was provided for patient.     Juvenal Vora PA-C

## 2021-06-16 NOTE — PROGRESS NOTES
Assessment/Plan:        1. PND (post-nasal drip)  - CT Sinuses Without Contrast;   Will follow up pending the study and manage accordingly.     2. Burning with urination    - Urinalysis-UC if Indicated  - Ambulatory referral to Urology      3. RLQ abdominal pain  Exam findings were discussed.     Plan:   - Comprehensive Metabolic Panel  - HM1(CBC and Differential)  - Urinalysis-UC if Indicated  - CT Abdomen Pelvis With Oral With IV Contrast; Future  - CT Abdomen Pelvis With Oral With IV Contrast  - HM1 (CBC with Diff)  - Culture, Urine      Will follow up pending the study and manage accordingly.         Subjective:    Patient ID:   Agustín Conway is a 22 y.o. male here to establish care.     Other concerns to address:     1. Sinus problems:    On for several months, with sx of needing to Clearing throat, keeping him up at night, PND, congestion and cough.  Feels having pressure in the nose, and eyes swollen.   He was recently seen for URI, and sinus, treated with cefdinir, and albuterol without much improvement of his sinuses, and concerned with persisting sinus infection.          2. Burning with urination     On going since last January, and noted mainly first thing in the morning  He feels like needing to urniate more than usual.   Recent antibiotic use has not produced any improving effect.        3. RLQ abdominal pain     Vague/ dull sensation in the past 2 months,   Not sure if its related to his urinary sx.   Denies fever, chills, night sweats, Nausea/ Vomiting/ Diarrhea/ Constipation            allergies, current medications, past family history, past medical history, past social history, past surgical history and problem list.    Review of Systems  A complete 10 point review of systems was obtained and is negative other than what is stated in the HPI.           Objective:   /74  Pulse 68  Temp 97.7  F (36.5  C) (Oral)   Wt 206 lb (93.4 kg)  SpO2 99%  BMI 27.55 kg/m2      Physical Exam  General  Appearance:    Alert, well hydrated, no distress,    Eyes:    PERRL, conjunctiva/corneas clear,    Throat:   Lips, mucosa, and tongue normal; teeth and gums normal   Neck:   Supple, symmetrical, trachea midline, no adenopathy;        thyroid:  No enlargement/tenderness/nodules; no carotid    bruit or JVD   Lungs:     Clear to auscultation bilaterally, respirations unlabored   Heart:    Regular rate and rhythm, S1 and S2 normal, no murmur, rub   or gallop   Abdomen/ :     Soft, non-tender, normal bowel sounds, no rebound or guarding, no masses, no organomegaly  RLQ dull pain for 3 months.    Extremities:   Extremities normal, atraumatic, no cyanosis or edema   Skin:   Skin color, texture, turgor normal, no rashes or lesions

## 2021-06-17 NOTE — PROGRESS NOTES
"Chief complaint:  Possible allergies    History of Present Illness:  This is pleasant 22 year old man here today for possible allergies.  He states that since last summer he is struggling with puffy eyes, itchy throat and runny nose.  States symptoms have been persistent.  He is seen by his primary care provider who recommended that he see ENT.  ENT scoped him and reported that his symptoms were likely consistent with allergy.  He has seen some improvement with Claritin and Flonase.  He did have some nasal bleeding with Flonase.  Antibiotics were prescribed previously that did not seem to help.  He moved into his aunt's home in March 2017.  It is an older home.  He is not sure if there is mold damage.  He does believe it is tonny.  He is never had these symptoms before.  No cough, wheeze or shortness of breath.  No history of asthma.    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    No current outpatient prescriptions on file.    No Known Allergies    /70  Pulse 69  Ht 6' 0.5\" (1.842 m)  Wt 210 lb (95.3 kg)  BMI 28.09 kg/m2  Gen: Pleasant male not in acute distress  HEENT: Eyes no erythema of the bulbar or palpebral conjunctiva, no edema. Ears: TMs well visualized, no effusions. Nose: No congestion, mucosa normal. Mouth: Throat clear, no lip or tongue edema.   Cardiac: Regular rate and rhythm, no murmurs, rubs or gallops  Respiratory: Clear to auscultation bilaterally, no adventitious breath sounds  Lymph: No supraclavicular or cervical lymphadenopathy  Skin: No rashes or lesions  Psych: Alert and oriented times 3    Last Percutaneous Allergy Test Results  Trees  Humberto, White  1:20 H  (W/F in mm): 0-0 (04/19/18 1106)  Birch Mix 1:20 H (W/F in mm): 0-0 (04/19/18 1106)  Rosepine, Common " 1:20 H (W/F in mm): 0-0 (04/19/18 1106)  Elm, American 1:20 H (W/F in mm): 0-0 (04/19/18 1106)  Tacoma, Shagbark 1:20 H (W/F in mm): 0-0 (04/19/18 1106)  Maple, Hard/Sugar 1:20 H (W/F in mm): 0-0 (04/19/18 1106)  Belington Mix 1:20 H (W/F in mm): 0-0 (04/19/18 1106)  Oak, Red 1:20 H (W/F in mm): 0-0 (04/19/18 1106)  Mangum, American 1:20 H (W/F in mm): 0-0 (04/19/18 1106)  [place] Tree 1:20 H (W/F in mm): 0-0 (04/19/18 1106)  Dust Mites  D. Pteronyssinus Mite 30,000 AU/ML H (W/F in mm): 0-0 (04/19/18 1106)  D. Farinae Mite 30,000 AU/ML H (W/F in mm: 0-0 (04/19/18 1106)  Grasses  Grass Mix #4 10,000 BAU/ML H: 5-20 (04/19/18 1106)  Lucas Grass 1:20 H (W/F in mm): 0-0 (04/19/18 1106)  Cockroach  Cockroach Mix 1:10 H (W/F in mm): 0-0 (04/19/18 1106)  Molds/Fungi  Alternaria Tenuis 1:10 H (W/F in mm): 0-0 (04/19/18 1106)  Aspergillus Fumigatus 1:10 H (W/F in mm): 0-0 (04/19/18 1106)  Homodendrum Cladosporioides 1:10 H (W/F in mm): 0-0 (04/19/18 1106)  Penicillin Notatum 1:10 H (W/F in mm): 0-0 (04/19/18 1106)  Epicoccum 1:10 H (W/F in mm): 0-0 (04/19/18 1106)  Weeds  Ragweed, Short 1:20 H (W/F in mm): 0-0 (04/19/18 1106)  Dock, Sorrel 1:20 H (W/F in mm): 0-0 (04/19/18 1106)  Lamb's Quarter 1:20 H (W/F in mm): 0-0 (04/19/18 1106)  Pigweed, Rough Red Root 1:20 H  (W/F in mm): 0-0 (04/19/18 1106)  Plantain, English 1:20 H  (W/F in mm): 0-0 (04/19/18 1106)  Sagebrush, Mugwort 1:20 H  (W/F in mm): 0-0 (04/19/18 1106)  Animal  Cat 10,000 BAU/ML H (W/F in mm): 5-F (04/19/18 1106)  Dog 1:10 H (W/F in mm): 3-F (04/19/18 1106)  Controls  Device Type: QUINTIP (04/19/18 1106)  Neg. control: 50% Glycerine/Saline H (W/F in mm): 0-0 (04/19/18 1106)  Pos. control: Histamine 6mg/ML (W/F in mms): 4-30 (04/19/18 1106)    Impression report and plan:  1.  Rhinitis    Testing was negative.  I recommended that he restart the Flonase as his nose looks better today on exam.  I recommended nasal rinses.  If this does not improve symptoms, he  may need to discuss again with ENT.  He could also try Astelin nasal spray if we think may be missing an allergen.  I did prescribe this for him today.

## 2021-06-17 NOTE — PROGRESS NOTES
HISTORY OF PRESENT ILLNESS  Patient reports that about a year ago developed a bad sinus infection. Been to the doctor 3 times. Post nasal drip. Swelling around the eyes. In the morning he has lots of mucous in the throat. Has to blow nose 3 times in the morning. Always clearing his throat. Keeps him up at night. He has not seen Allergy. No prior scans or xrays. CT scan was ordered and canceled because they wanted to see a specialist.     REVIEW OF SYSTEMS  Review of Systems: a 10-system review was performed. Pertinent positives are noted in the HPI and on a separate scanned document in the chart.    PMH, PSH, FH and SH has documented in the EHR.      EXAM    CONSTITUTIONAL  General Appearance:  Normal, well developed, well nourished, no obvious distress  Ability to Communicate:  communicates appropriately.    HEAD AND FACE  Appearance and Symmetry:  Normal, no scalp or facial scarring or suspicious lesions.  Paranasal sinuses tenderness:  Normal, Paranasal sinuses non tender    EARS  Clinical speech reception threshold:  Normal, able to hear normal speech.  Auricle:  Normal, Auricles without scars, lesions, masses.  External auditory canal:  Normal, External auditory canal normal. Cerumen right.  Tympanic membrane:  Normal, Tympanic membranes normal without swelling or erythema.  NOSE (speculum or scope)  Architecture:  Normal, Grossly normal external nasal architecture with no masses or lesions.  Mucosa:  Normal mucosa, No polyps or masses.  Septum:  Left septal spur.  Turbinates:  Normal, No turbinate abnormalities    ORAL CAVITY AND OROPHARYNX  Lips:  Normal.  Dental and gingiva:  Normal, No obvious dental or gingival disease.  Mucosa:  Normal, Moist mucous membranes.  Tongue:  Normal, Tongue mobile with no mucosal abnormalities  Hard and soft palate:  Normal, Hard and soft palate without cleft or mucosal lesions.  Oral pharynx:  Normal, Posterior pharynx without lesions or remarkable asymmetry.  Saliva:  Normal,  Clear saliva.  Masses:  Normal, No palpable masses or pathologically enlarged lymph nodes.    NECK  Masses/lymph nodes:  Normal, No worrisome neck masses or lymph nodes.  Salivary glands:  Normal, Parotid and submandibular glands.  Trachea and larynx position:  Normal, Trachea and larynx midline.  Thyroid:  Normal, No thyroid abnormality.  Tenderness:  Normal, No cervical tenderness.  Suppleness:  Normal, Neck supple    NEUROLOGICAL  Speech pattern:  Normal, Proasaic    RESPIRATORY  Symmetry and Respiratory effort:  Normal, Symmetric chest movement and expansion with no increased intercostal retractions or use of accessory muscles.     IMPRESSION  Symptoms suggest allergic rhinitis. In fact the patient that antihistamine and fluticasone helped but he was getting nosebleeds from the spray.     RECOMMENDATION  I advised allergy evaluation to identify the potential allergen. I suggested holding off on the CT scan for now in favor of allergy evaluation.    Francisco Jennings MD

## 2021-06-22 NOTE — PROGRESS NOTES
Chief Complaint   Patient presents with     Sore Throat     cough     Diarrhea         HPI    Patient is here for the following issues:    #1. Cough - x almost 2 wks with nasal congestion and sore throat. No fever, chills, chest pain, shortness of breath. He took Sudafed with some relief.    #2. Diarrhea - x a few days, mostly at night, watery, nonbloody, improving. No diarrhea since this AM. No recent travel, dietary changes.       ROS: Pertinent ROS noted in HPI.     No Known Allergies    Patient Active Problem List   Diagnosis     Bipolar Disorder     ADHD, Combined Type     Migraine Headache     Astigmatism     Pes Planus     Sleep Disturbances     Learning Disability     Asthma       Family History   Problem Relation Age of Onset     No Medical Problems Mother      No Medical Problems Father        Social History     Socioeconomic History     Marital status: Single     Spouse name: Not on file     Number of children: Not on file     Years of education: Not on file     Highest education level: Not on file   Social Needs     Financial resource strain: Not on file     Food insecurity - worry: Not on file     Food insecurity - inability: Not on file     Transportation needs - medical: Not on file     Transportation needs - non-medical: Not on file   Occupational History     Not on file   Tobacco Use     Smoking status: Former Smoker     Smokeless tobacco: Former User   Substance and Sexual Activity     Alcohol use: Yes     Alcohol/week: 1.8 oz     Types: 3 Standard drinks or equivalent per week     Drug use: No     Sexual activity: Not on file   Other Topics Concern     Not on file   Social History Narrative     Not on file     Objective:    Vitals:    12/05/18 1714   BP: 118/76   Pulse: 81   Resp: 18   Temp: 98.7  F (37.1  C)   SpO2: 97%       Gen:NAD  Throat: oropharynx clear, tonsils normal  Ears: TMs clear without effusion, ear canals normal with minimal cerumen  Nose: clear rhinorrhea  Neck: no significant  adenopathy  CV: RRR, normal S1S2,no M, R, G  Pulm: CTAB, normal effort  Abd: normal inspection, normal bowel sounds, soft, no pain, no mass/HSM      Viral upper respiratory tract infection - normal exam. Supportive cares as directed.     Throat pain  -     Rapid Strep A Screen-Throat swab  -     Group A Strep, RNA Direct Detection, Throat    Diarrhea, unspecified type - improving, normal exam. Advised fluids, dietary modification (bland diet while having diarrhea). No further evaluation recommended.

## 2021-07-03 NOTE — ADDENDUM NOTE
Addendum Note by Melony Tolbert MD at 11/29/2017 11:59 AM     Author: Melony Tolbert MD Service: -- Author Type: Physician    Filed: 11/29/2017 11:59 AM Encounter Date: 11/29/2017 Status: Signed    : Melony Tolbert MD (Physician)    Addended by: MELONY TOLBERT on: 11/29/2017 11:59 AM        Modules accepted: Orders

## 2022-10-28 ENCOUNTER — OFFICE VISIT (OUTPATIENT)
Dept: INTERNAL MEDICINE | Facility: CLINIC | Age: 27
End: 2022-10-28
Payer: COMMERCIAL

## 2022-10-28 ENCOUNTER — TELEPHONE (OUTPATIENT)
Dept: INTERNAL MEDICINE | Facility: CLINIC | Age: 27
End: 2022-10-28

## 2022-10-28 VITALS
HEIGHT: 72 IN | OXYGEN SATURATION: 100 % | SYSTOLIC BLOOD PRESSURE: 116 MMHG | HEART RATE: 66 BPM | TEMPERATURE: 97.1 F | DIASTOLIC BLOOD PRESSURE: 78 MMHG | BODY MASS INDEX: 28.62 KG/M2 | WEIGHT: 211.3 LBS

## 2022-10-28 DIAGNOSIS — R74.01 TRANSAMINITIS: ICD-10-CM

## 2022-10-28 DIAGNOSIS — F90.0 ATTENTION DEFICIT HYPERACTIVITY DISORDER (ADHD), PREDOMINANTLY INATTENTIVE TYPE: ICD-10-CM

## 2022-10-28 DIAGNOSIS — Z00.00 ANNUAL PHYSICAL EXAM: Primary | ICD-10-CM

## 2022-10-28 LAB
ALBUMIN SERPL BCG-MCNC: 4.8 G/DL (ref 3.5–5.2)
ALP SERPL-CCNC: 52 U/L (ref 40–129)
ALT SERPL W P-5'-P-CCNC: 40 U/L (ref 10–50)
ANION GAP SERPL CALCULATED.3IONS-SCNC: 11 MMOL/L (ref 7–15)
AST SERPL W P-5'-P-CCNC: 29 U/L (ref 10–50)
BILIRUB SERPL-MCNC: 0.4 MG/DL
BUN SERPL-MCNC: 13.5 MG/DL (ref 6–20)
CALCIUM SERPL-MCNC: 9.5 MG/DL (ref 8.6–10)
CHLORIDE SERPL-SCNC: 99 MMOL/L (ref 98–107)
CREAT SERPL-MCNC: 0.96 MG/DL (ref 0.67–1.17)
DEPRECATED HCO3 PLAS-SCNC: 24 MMOL/L (ref 22–29)
ERYTHROCYTE [DISTWIDTH] IN BLOOD BY AUTOMATED COUNT: 12.1 % (ref 10–15)
GFR SERPL CREATININE-BSD FRML MDRD: >90 ML/MIN/1.73M2
GLUCOSE SERPL-MCNC: 87 MG/DL (ref 70–99)
HCT VFR BLD AUTO: 42.7 % (ref 40–53)
HGB BLD-MCNC: 14.5 G/DL (ref 13.3–17.7)
MCH RBC QN AUTO: 30.8 PG (ref 26.5–33)
MCHC RBC AUTO-ENTMCNC: 34 G/DL (ref 31.5–36.5)
MCV RBC AUTO: 91 FL (ref 78–100)
PLATELET # BLD AUTO: 218 10E3/UL (ref 150–450)
POTASSIUM SERPL-SCNC: 4.5 MMOL/L (ref 3.4–5.3)
PROT SERPL-MCNC: 7.5 G/DL (ref 6.4–8.3)
RBC # BLD AUTO: 4.71 10E6/UL (ref 4.4–5.9)
SODIUM SERPL-SCNC: 134 MMOL/L (ref 136–145)
WBC # BLD AUTO: 3.7 10E3/UL (ref 4–11)

## 2022-10-28 PROCEDURE — 99214 OFFICE O/P EST MOD 30 MIN: CPT | Mod: 25 | Performed by: INTERNAL MEDICINE

## 2022-10-28 PROCEDURE — 85027 COMPLETE CBC AUTOMATED: CPT | Performed by: INTERNAL MEDICINE

## 2022-10-28 PROCEDURE — 99395 PREV VISIT EST AGE 18-39: CPT | Performed by: INTERNAL MEDICINE

## 2022-10-28 PROCEDURE — 36415 COLL VENOUS BLD VENIPUNCTURE: CPT | Performed by: INTERNAL MEDICINE

## 2022-10-28 PROCEDURE — 80053 COMPREHEN METABOLIC PANEL: CPT | Performed by: INTERNAL MEDICINE

## 2022-10-28 RX ORDER — DEXTROAMPHETAMINE SACCHARATE, AMPHETAMINE ASPARTATE MONOHYDRATE, DEXTROAMPHETAMINE SULFATE AND AMPHETAMINE SULFATE 2.5; 2.5; 2.5; 2.5 MG/1; MG/1; MG/1; MG/1
10 CAPSULE, EXTENDED RELEASE ORAL DAILY
Qty: 30 CAPSULE | Refills: 0 | Status: SHIPPED | OUTPATIENT
Start: 2022-10-28 | End: 2022-11-27

## 2022-10-28 ASSESSMENT — PAIN SCALES - GENERAL: PAINLEVEL: NO PAIN (0)

## 2022-10-28 NOTE — LETTER
October 31, 2022      Agustín Conway  [address]  [place] [address]        Dear ,    We are writing to inform you of your test results.    Your test results fall within the expected range(s) or remain unchanged from previous results.  Please continue with current treatment plan.    Resulted Orders   CBC with platelets   Result Value Ref Range    WBC Count 3.7 (L) 4.0 - 11.0 10e3/uL    RBC Count 4.71 4.40 - 5.90 10e6/uL    Hemoglobin 14.5 13.3 - 17.7 g/dL    Hematocrit 42.7 40.0 - 53.0 %    MCV 91 78 - 100 fL    MCH 30.8 26.5 - 33.0 pg    MCHC 34.0 31.5 - 36.5 g/dL    RDW 12.1 10.0 - 15.0 %    Platelet Count 218 150 - 450 10e3/uL   Comprehensive metabolic panel   Result Value Ref Range    Sodium 134 (L) 136 - 145 mmol/L    Potassium 4.5 3.4 - 5.3 mmol/L    Chloride 99 98 - 107 mmol/L    Carbon Dioxide (CO2) 24 22 - 29 mmol/L    Anion Gap 11 7 - 15 mmol/L    Urea Nitrogen 13.5 6.0 - 20.0 mg/dL    Creatinine 0.96 0.67 - 1.17 mg/dL    Calcium 9.5 8.6 - 10.0 mg/dL    Glucose 87 70 - 99 mg/dL    Alkaline Phosphatase 52 40 - 129 U/L    AST 29 10 - 50 U/L    ALT 40 10 - 50 U/L    Protein Total 7.5 6.4 - 8.3 g/dL    Albumin 4.8 3.5 - 5.2 g/dL    Bilirubin Total 0.4 <=1.2 mg/dL    GFR Estimate >90 >60 mL/min/1.73m2      Comment:      Effective December 21, 2021 eGFRcr in adults is calculated using the 2021 CKD-EPI creatinine equation which includes age and gender ( et al., NEJM, DOI: 10.1056/GEELqi8825496)       If you have any questions or concerns, please call the clinic at the number listed above.       Sincerely,      Dick Flynn MD

## 2022-10-28 NOTE — PROGRESS NOTES
Office Visit - Physical   Agustín Conway   26 year old  male    Date of visit: 10/28/2022  Physician: Dick Flynn MD     Assessment and Plan   1. Annual physical exam  This is a 26-year-old man with issues as discussed below.  Ongoing healthy lifestyle discussed and recommended.  He is not drinking alcohol every night and states he is limiting alcohol to 4 or 5 drinks a night.  No other drug use.  He Klein screening for sexually transmitted infections.  He declines vaccinations today.    2. Attention deficit hyperactivity disorder (ADHD), predominantly inattentive type  Before I had his ADHD assessment we were going to start Adderall and he was going to bring in his assessment in 1 month at which point we would fill out a controlled substance agreement, do a urine drug screen and assess how medication is working.  Would still like him to come in in 1 month even though I now have the assessment available.  - amphetamine-dextroamphetamine (ADDERALL XR) 10 MG 24 hr capsule; Take 1 capsule (10 mg) by mouth daily for 30 days  Dispense: 30 capsule; Refill: 0    3. Transaminitis  Suspect related to fatty liver and alcohol.  States he has significantly cut down on alcohol.  - CBC with platelets; Future  - Comprehensive metabolic panel; Future  - CBC with platelets  - Comprehensive metabolic panel    Return in about 4 weeks (around 11/25/2022) for Follow up.     Chief Complaint   Chief Complaint   Patient presents with     RECHECK     A.D.H.D        Patient Profile   Social History     Social History Narrative    Lives alone.  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          Past Medical History   Patient Active Problem List   Diagnosis     Seasonal allergic rhinitis       Past Surgical History  He has a past surgical history that includes Ankle surgery (Right); Orif Wrist Fracture; and Orif Elbow Fracture (Left).     History of Present Illness   This 26 year old man comes in for annual physical and for evaluation of  symptoms related to ADHD.  He did not have his formal assessment for ADHD at the time of this visit but subsequent to him leaving I do have the assessment and hand.  I reviewed this and it confirms diagnosis of ADHD.  There is also suggestion of bipolar disease but he feels that this is not an issue now and has had no recent symptoms of frederick or depression.  He is not on any medication currently.  He also has a nonverbal learning disability.  He has been struggling with attention at work and is worried that there could be consequences including job loss if he does not improve his job performance and attention.  He has not been on stimulant medication in the past but is interested in starting.  His mother is on Adderall and states this is changed her life.  She is encouraging him to consider this.    Review of Systems: A comprehensive review of systems was negative except as noted.     Medications and Allergies   Current Outpatient Medications   Medication Sig Dispense Refill     amphetamine-dextroamphetamine (ADDERALL XR) 10 MG 24 hr capsule Take 1 capsule (10 mg) by mouth daily for 30 days 30 capsule 0     cetirizine (ZYRTEC) 10 MG tablet [CETIRIZINE (ZYRTEC) 10 MG TABLET] Take 10 mg by mouth daily. (Patient not taking: Reported on 10/28/2022)       No Known Allergies     Family and Social History   Family History   Problem Relation Age of Onset     No Known Problems Mother      No Known Problems Father      Substance Abuse Brother         Social History     Tobacco Use     Smoking status: Former     Smokeless tobacco: Former   Substance Use Topics     Alcohol use: Yes     Alcohol/week: 3.0 standard drinks     Drug use: No        Physical Exam   General Appearance:   No acute distress    /78   Pulse 66   Temp 97.1  F (36.2  C) (Tympanic)   Ht 1.829 m (6')   Wt 95.8 kg (211 lb 4.8 oz)   SpO2 100%   BMI 28.66 kg/m      EYES: Eyelids, conjunctiva, and sclera were normal. Pupils were normal. Cornea, iris,  and lens were normal bilaterally.  HEAD, EARS, NOSE, MOUTH, AND THROAT: Head and face were normal. Hearing was normal to voice and the ears were normal to external exam.   NECK: Neck appearance was normal. There were no neck masses and the thyroid was not enlarged.  RESPIRATORY: Breathing pattern was normal and the chest moved symmetrically.  Percussion/auscultatory percussion was normal.  Lung sounds were normal and there were no abnormal sounds.  CARDIOVASCULAR: Heart rate and rhythm were normal.  S1 and S2 were normal and there were no extra sounds or murmurs. Peripheral pulses in arms and legs were normal.  Jugular venous pressure was normal.  There was no peripheral edema.  GASTROINTESTINAL: The abdomen was normal in contour.  Bowel sounds were present.  Percussion detected no organ enlargement or tenderness.  Palpation detected no tenderness, mass, or enlarged organs.   MUSCULOSKELETAL: Skeletal configuration was normal and muscle mass was normal for age. Joint appearance was overall normal.  LYMPHATIC: There were no enlarged nodes.  SKIN/HAIR/NAILS: Skin color was normal.  There were no skin lesions.  Hair and nails were normal.  NEUROLOGIC: The patient was alert and oriented to person, place, time, and circumstance. Speech was normal. Cranial nerves were normal. Motor strength was normal for age. The patient was normally coordinated.  PSYCHIATRIC:  Mood and affect were normal and the patient had normal recent and remote memory. The patient's judgment and insight were normal.       Additional Information        Dick Flynn MD  Internal Medicine  Contact me at [phone]    Answers for HPI/ROS submitted by the patient on 10/28/2022  What is the reason for your visit today? : check up  How many servings of fruits and vegetables do you eat daily?: 2-3  On average, how many sweetened beverages do you drink each day (Examples: soda, juice, sweet tea, etc.  Do NOT count diet or artificially sweetened  beverages)?: 1  How many minutes a day do you exercise enough to make your heart beat faster?: 30 to 60  How many days a week do you exercise enough to make your heart beat faster?: 5  How many days per week do you miss taking your medication?: 0

## 2022-12-02 ENCOUNTER — OFFICE VISIT (OUTPATIENT)
Dept: INTERNAL MEDICINE | Facility: CLINIC | Age: 27
End: 2022-12-02
Payer: COMMERCIAL

## 2022-12-02 VITALS
HEART RATE: 86 BPM | DIASTOLIC BLOOD PRESSURE: 70 MMHG | BODY MASS INDEX: 28.58 KG/M2 | HEIGHT: 72 IN | OXYGEN SATURATION: 99 % | WEIGHT: 211 LBS | SYSTOLIC BLOOD PRESSURE: 120 MMHG | TEMPERATURE: 98.2 F

## 2022-12-02 DIAGNOSIS — F90.0 ATTENTION DEFICIT HYPERACTIVITY DISORDER (ADHD), PREDOMINANTLY INATTENTIVE TYPE: Primary | ICD-10-CM

## 2022-12-02 DIAGNOSIS — H69.91 DYSFUNCTION OF RIGHT EUSTACHIAN TUBE: ICD-10-CM

## 2022-12-02 DIAGNOSIS — L30.9 DERMATITIS: ICD-10-CM

## 2022-12-02 LAB — CREAT UR-MCNC: 168 MG/DL

## 2022-12-02 PROCEDURE — 80307 DRUG TEST PRSMV CHEM ANLYZR: CPT | Performed by: INTERNAL MEDICINE

## 2022-12-02 PROCEDURE — 99214 OFFICE O/P EST MOD 30 MIN: CPT | Performed by: INTERNAL MEDICINE

## 2022-12-02 RX ORDER — CLOTRIMAZOLE AND BETAMETHASONE DIPROPIONATE 10; .64 MG/G; MG/G
CREAM TOPICAL 2 TIMES DAILY
Qty: 45 G | Refills: 1 | Status: SHIPPED | OUTPATIENT
Start: 2022-12-02 | End: 2024-09-26

## 2022-12-02 RX ORDER — DEXTROAMPHETAMINE SACCHARATE, AMPHETAMINE ASPARTATE MONOHYDRATE, DEXTROAMPHETAMINE SULFATE AND AMPHETAMINE SULFATE 2.5; 2.5; 2.5; 2.5 MG/1; MG/1; MG/1; MG/1
10 CAPSULE, EXTENDED RELEASE ORAL DAILY
Qty: 30 CAPSULE | Refills: 0 | Status: SHIPPED | OUTPATIENT
Start: 2023-01-02 | End: 2023-01-11

## 2022-12-02 RX ORDER — DEXTROAMPHETAMINE SACCHARATE, AMPHETAMINE ASPARTATE MONOHYDRATE, DEXTROAMPHETAMINE SULFATE AND AMPHETAMINE SULFATE 2.5; 2.5; 2.5; 2.5 MG/1; MG/1; MG/1; MG/1
10 CAPSULE, EXTENDED RELEASE ORAL DAILY
Qty: 30 CAPSULE | Refills: 0 | Status: CANCELLED | OUTPATIENT
Start: 2022-12-02

## 2022-12-02 RX ORDER — DEXTROAMPHETAMINE SACCHARATE, AMPHETAMINE ASPARTATE MONOHYDRATE, DEXTROAMPHETAMINE SULFATE AND AMPHETAMINE SULFATE 2.5; 2.5; 2.5; 2.5 MG/1; MG/1; MG/1; MG/1
10 CAPSULE, EXTENDED RELEASE ORAL DAILY
COMMUNITY
End: 2022-12-02

## 2022-12-02 RX ORDER — DEXTROAMPHETAMINE SACCHARATE, AMPHETAMINE ASPARTATE MONOHYDRATE, DEXTROAMPHETAMINE SULFATE AND AMPHETAMINE SULFATE 2.5; 2.5; 2.5; 2.5 MG/1; MG/1; MG/1; MG/1
10 CAPSULE, EXTENDED RELEASE ORAL DAILY
Qty: 30 CAPSULE | Refills: 0 | Status: SHIPPED | OUTPATIENT
Start: 2023-02-02 | End: 2023-03-04

## 2022-12-02 RX ORDER — DEXTROAMPHETAMINE SACCHARATE, AMPHETAMINE ASPARTATE MONOHYDRATE, DEXTROAMPHETAMINE SULFATE AND AMPHETAMINE SULFATE 2.5; 2.5; 2.5; 2.5 MG/1; MG/1; MG/1; MG/1
10 CAPSULE, EXTENDED RELEASE ORAL DAILY
Qty: 30 CAPSULE | Refills: 0 | Status: SHIPPED | OUTPATIENT
Start: 2022-12-02 | End: 2023-01-01

## 2022-12-02 NOTE — PROGRESS NOTES
Office Visit - Follow Up   Agustín Conway   26 year old male    Date of Visit: 12/2/2022    Chief Complaint   Patient presents with     Follow Up     Med follow up         Assessment and Plan   1. Attention deficit hyperactivity disorder (ADHD), predominantly inattentive type  - Drug Confirmation Panel Urine with Creat - lab collect; Future  - amphetamine-dextroamphetamine (ADDERALL XR) 10 MG 24 hr capsule; Take 1 capsule (10 mg) by mouth daily for 30 days  Dispense: 30 capsule; Refill: 0  - amphetamine-dextroamphetamine (ADDERALL XR) 10 MG 24 hr capsule; Take 1 capsule (10 mg) by mouth daily for 30 days  Dispense: 30 capsule; Refill: 0  - amphetamine-dextroamphetamine (ADDERALL XR) 10 MG 24 hr capsule; Take 1 capsule (10 mg) by mouth daily for 30 days  Dispense: 30 capsule; Refill: 0  - Drug Confirmation Panel Urine with Creat - lab collect    2. Dermatitis  - clotrimazole-betamethasone (LOTRISONE) 1-0.05 % external cream; Apply topically 2 times daily  Dispense: 45 g; Refill: 1    3. Dysfunction of right eustachian tube  He will follow-up with ENT      Return in about 6 months (around 6/2/2023) for Follow up.     History of Present Illness   This 26 year old comes in for follow-up.  Overall he is doing well.  Adderall is quite effective.  Have improved his mood attention concentration and productivity.  He has a skin rash in his groin that comes intermittently has been putting on some antifungal ointment which works.  Currently does not have the rash but it recurs frequently.  Also has some fullness and loss of hearing in his right ear wonders if he might have some earwax.  Has seen ENT in the past       Physical Exam   General Appearance:   No acute distress    /70 (BP Location: Left arm, Patient Position: Sitting, Cuff Size: Adult Regular)   Pulse 86   Temp 98.2  F (36.8  C)   Ht 1.829 m (6')   Wt 95.7 kg (211 lb)   SpO2 99%   BMI 28.62 kg/m      No earwax in the right external ear canal      Additional Information   Current Outpatient Medications   Medication Sig Dispense Refill     amphetamine-dextroamphetamine (ADDERALL XR) 10 MG 24 hr capsule Take 1 capsule (10 mg) by mouth daily for 30 days 30 capsule 0     [START ON 1/2/2023] amphetamine-dextroamphetamine (ADDERALL XR) 10 MG 24 hr capsule Take 1 capsule (10 mg) by mouth daily for 30 days 30 capsule 0     [START ON 2/2/2023] amphetamine-dextroamphetamine (ADDERALL XR) 10 MG 24 hr capsule Take 1 capsule (10 mg) by mouth daily for 30 days 30 capsule 0     cetirizine (ZYRTEC) 10 MG tablet Take 10 mg by mouth daily       clotrimazole-betamethasone (LOTRISONE) 1-0.05 % external cream Apply topically 2 times daily 45 g 1     No Known Allergies    Time:      Dick Flynn MD    Answers for HPI/ROS submitted by the patient on 12/2/2022  What is the reason for your visit today? : checking back  How many servings of fruits and vegetables do you eat daily?: 2-3  On average, how many sweetened beverages do you drink each day (Examples: soda, juice, sweet tea, etc.  Do NOT count diet or artificially sweetened beverages)?: 2  How many minutes a day do you exercise enough to make your heart beat faster?: 30 to 60  How many days a week do you exercise enough to make your heart beat faster?: 5  How many days per week do you miss taking your medication?: 2

## 2022-12-06 LAB
AMPHET UR CFM-MCNC: 475 NG/ML
AMPHET/CREAT UR: 283 NG/MG {CREAT}

## 2022-12-28 ENCOUNTER — TELEPHONE (OUTPATIENT)
Dept: INTERNAL MEDICINE | Facility: CLINIC | Age: 27
End: 2022-12-28

## 2022-12-28 NOTE — TELEPHONE ENCOUNTER
General Call      Reason for Call:  Pt calling regarding Lotrisone external cream, this is working but not solving the problem    Please advise    What are your questions or concerns:  n/a    Date of last appointment with provider: n/a    Okay to leave a detailed message?: Yes at Cell number on file:    Telephone Information:   Mobile [phone]

## 2022-12-29 NOTE — TELEPHONE ENCOUNTER
Left message to call back for: Agustín  Information to relay to patient: please relay message below.

## 2022-12-29 NOTE — TELEPHONE ENCOUNTER
Red dotted rash and flaking continues. Pt notes when using the cream consistently this disappears. However, once her stops it does return.

## 2023-01-10 DIAGNOSIS — F90.0 ATTENTION DEFICIT HYPERACTIVITY DISORDER (ADHD), PREDOMINANTLY INATTENTIVE TYPE: ICD-10-CM

## 2023-01-10 DIAGNOSIS — L30.9 DERMATITIS: Primary | ICD-10-CM

## 2023-01-10 NOTE — TELEPHONE ENCOUNTER
Dermatology referral pended as well as requested medication.       Routing refill request to provider for review/approval because:  Drug not on the [facility] refill protocol

## 2023-01-10 NOTE — TELEPHONE ENCOUNTER
Refill Request  Medication name: Pending Prescriptions:                       Disp   Refills    amphetamine-dextroamphetamine (ADDERALL X*30 cap*0            Sig: Take 1 capsule (10 mg) by mouth daily    Requested Pharmacy: [facility]       **pt Is also asking for a referral to dermatology for rash that he has discussed with Dr Flynn at previous appt*    Please call to advise [phone] ok to leave detailed message

## 2023-01-11 RX ORDER — DEXTROAMPHETAMINE SACCHARATE, AMPHETAMINE ASPARTATE MONOHYDRATE, DEXTROAMPHETAMINE SULFATE AND AMPHETAMINE SULFATE 2.5; 2.5; 2.5; 2.5 MG/1; MG/1; MG/1; MG/1
10 CAPSULE, EXTENDED RELEASE ORAL DAILY
Qty: 30 CAPSULE | Refills: 0 | Status: SHIPPED | OUTPATIENT
Start: 2023-01-11 | End: 2023-03-08

## 2023-03-08 DIAGNOSIS — F90.0 ATTENTION DEFICIT HYPERACTIVITY DISORDER (ADHD), PREDOMINANTLY INATTENTIVE TYPE: ICD-10-CM

## 2023-03-08 RX ORDER — DEXTROAMPHETAMINE SACCHARATE, AMPHETAMINE ASPARTATE MONOHYDRATE, DEXTROAMPHETAMINE SULFATE AND AMPHETAMINE SULFATE 2.5; 2.5; 2.5; 2.5 MG/1; MG/1; MG/1; MG/1
10 CAPSULE, EXTENDED RELEASE ORAL DAILY
Qty: 30 CAPSULE | Refills: 0 | Status: SHIPPED | OUTPATIENT
Start: 2023-03-08 | End: 2023-04-17

## 2023-03-09 DIAGNOSIS — L30.9 DERMATITIS: ICD-10-CM

## 2023-03-09 NOTE — TELEPHONE ENCOUNTER
General Call      Reason for Call:  Pt was seen last fall for a rash was referred to dermatology --was unable to get in  Wondering about a different dermatologist or can he be seen by Dr Flynn for his help    Please advise    What are your questions or concerns:  n/a    Date of last appointment with provider: n/a    Okay to leave a detailed message?: Yes at Cell number on file:    Telephone Information:   Mobile [phone]

## 2023-03-13 RX ORDER — CLOTRIMAZOLE AND BETAMETHASONE DIPROPIONATE 10; .64 MG/G; MG/G
CREAM TOPICAL 2 TIMES DAILY
Qty: 45 G | Refills: 0 | Status: CANCELLED | OUTPATIENT
Start: 2023-03-13

## 2023-03-13 NOTE — TELEPHONE ENCOUNTER
Spoke with patient. This nurse suggested patient calling his his insurance plan- [employer]- and seeing if they have an earlier derm consult. Patient ok with plan and said he will call today.     Patient also requesting a refill on Lotrisone. Request sent to his covering provider.

## 2023-03-14 NOTE — TELEPHONE ENCOUNTER
Patient should not have been using this medication every day since last year.  If his rash is still there and he has been using this medicine that long, he needs to stop it and meet with a dermatologist.

## 2023-03-14 NOTE — TELEPHONE ENCOUNTER
Relayed provider advisement to patient.  Patient verbalized understanding.  No further questions at this time.

## 2023-04-14 DIAGNOSIS — F90.0 ATTENTION DEFICIT HYPERACTIVITY DISORDER (ADHD), PREDOMINANTLY INATTENTIVE TYPE: ICD-10-CM

## 2023-04-14 NOTE — TELEPHONE ENCOUNTER
Medication Question or Refill    Contacts       Type Contact Phone/Fax    04/14/2023 05:14 PM CDT Phone (Incoming) Agustín Conway (Self) [phone] (M)     Medication Refill          What medication are you calling about (include dose and sig)?: Adderall 10 mg daily    Preferred Pharmacy:   [facility]/pharmacy #[id number] - [address] [address]  Phone: [phone] Fax: [phone]    Controlled Substance Agreement on file:   CSA -- Patient Level:    CSA: None found at the patient level.       Who prescribed the medication?: Dick Flynn  Do you need a refill? Yes    When did you use the medication last? 4/14/23    Patient offered an appointment? No    Do you have any questions or concerns?  No      Okay to leave a detailed message?: Yes at Cell number on file:    Telephone Information:   Mobile [phone]

## 2023-04-17 RX ORDER — DEXTROAMPHETAMINE SACCHARATE, AMPHETAMINE ASPARTATE MONOHYDRATE, DEXTROAMPHETAMINE SULFATE AND AMPHETAMINE SULFATE 2.5; 2.5; 2.5; 2.5 MG/1; MG/1; MG/1; MG/1
10 CAPSULE, EXTENDED RELEASE ORAL DAILY
Qty: 30 CAPSULE | Refills: 0 | Status: SHIPPED | OUTPATIENT
Start: 2023-04-17 | End: 2023-07-16

## 2023-07-16 DIAGNOSIS — F90.0 ATTENTION DEFICIT HYPERACTIVITY DISORDER (ADHD), PREDOMINANTLY INATTENTIVE TYPE: ICD-10-CM

## 2023-07-16 NOTE — TELEPHONE ENCOUNTER
Requesting refill of Adderall.  Pharmacy he has used before is out..  He'd like it sent to :  [facility]. [facility]    Eleanor BALTAZAR RN [facility]

## 2023-07-17 DIAGNOSIS — F90.0 ATTENTION DEFICIT HYPERACTIVITY DISORDER (ADHD), PREDOMINANTLY INATTENTIVE TYPE: ICD-10-CM

## 2023-07-17 RX ORDER — DEXTROAMPHETAMINE SACCHARATE, AMPHETAMINE ASPARTATE MONOHYDRATE, DEXTROAMPHETAMINE SULFATE AND AMPHETAMINE SULFATE 2.5; 2.5; 2.5; 2.5 MG/1; MG/1; MG/1; MG/1
10 CAPSULE, EXTENDED RELEASE ORAL DAILY
Qty: 30 CAPSULE | Refills: 0 | Status: SHIPPED | OUTPATIENT
Start: 2023-07-17 | End: 2023-09-19

## 2023-07-17 RX ORDER — DEXTROAMPHETAMINE SACCHARATE, AMPHETAMINE ASPARTATE MONOHYDRATE, DEXTROAMPHETAMINE SULFATE AND AMPHETAMINE SULFATE 2.5; 2.5; 2.5; 2.5 MG/1; MG/1; MG/1; MG/1
10 CAPSULE, EXTENDED RELEASE ORAL DAILY
Qty: 30 CAPSULE | Refills: 0 | Status: SHIPPED | OUTPATIENT
Start: 2023-07-17 | End: 2023-08-18

## 2023-08-18 DIAGNOSIS — F90.0 ATTENTION DEFICIT HYPERACTIVITY DISORDER (ADHD), PREDOMINANTLY INATTENTIVE TYPE: ICD-10-CM

## 2023-08-18 RX ORDER — DEXTROAMPHETAMINE SACCHARATE, AMPHETAMINE ASPARTATE MONOHYDRATE, DEXTROAMPHETAMINE SULFATE AND AMPHETAMINE SULFATE 2.5; 2.5; 2.5; 2.5 MG/1; MG/1; MG/1; MG/1
10 CAPSULE, EXTENDED RELEASE ORAL DAILY
Qty: 30 CAPSULE | Refills: 0 | Status: SHIPPED | OUTPATIENT
Start: 2023-08-18 | End: 2023-08-21

## 2023-08-19 ENCOUNTER — TELEPHONE (OUTPATIENT)
Dept: INTERNAL MEDICINE | Facility: CLINIC | Age: 28
End: 2023-08-19
Payer: COMMERCIAL

## 2023-08-19 DIAGNOSIS — F90.0 ATTENTION DEFICIT HYPERACTIVITY DISORDER (ADHD), PREDOMINANTLY INATTENTIVE TYPE: ICD-10-CM

## 2023-08-19 NOTE — TELEPHONE ENCOUNTER
Medication Question or Refill    Contacts         Type Contact Phone/Fax    08/19/2023 01:59 PM CDT Phone (Incoming) Agustín Conway (Self) [phone] (M)            What medication are you calling about (include dose and sig)?: brionna    Preferred Pharmacy:  [facility]/pharmacy #[id number] - [place] [id number] [place] [address] [address]  Phone: [phone] Fax: [phone]      Controlled Substance Agreement on file:   CSA -- Patient Level:    CSA: None found at the patient level.       Who prescribed the medication?: pcp    Do you need a refill? Yes    When did you use the medication last? today    Patient offered an appointment? No    Do you have any questions or concerns?  Yes: needs this called into a different pharmacy as the one it was sent to is out of supply      Okay to leave a detailed message?: Yes at Home number on file [phone] (home)

## 2023-08-21 RX ORDER — DEXTROAMPHETAMINE SACCHARATE, AMPHETAMINE ASPARTATE MONOHYDRATE, DEXTROAMPHETAMINE SULFATE AND AMPHETAMINE SULFATE 2.5; 2.5; 2.5; 2.5 MG/1; MG/1; MG/1; MG/1
10 CAPSULE, EXTENDED RELEASE ORAL DAILY
Qty: 30 CAPSULE | Refills: 0 | Status: SHIPPED | OUTPATIENT
Start: 2023-08-21 | End: 2023-10-19
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
## 2023-09-19 DIAGNOSIS — F90.0 ATTENTION DEFICIT HYPERACTIVITY DISORDER (ADHD), PREDOMINANTLY INATTENTIVE TYPE: ICD-10-CM

## 2023-09-19 RX ORDER — DEXTROAMPHETAMINE SACCHARATE, AMPHETAMINE ASPARTATE MONOHYDRATE, DEXTROAMPHETAMINE SULFATE AND AMPHETAMINE SULFATE 2.5; 2.5; 2.5; 2.5 MG/1; MG/1; MG/1; MG/1
10 CAPSULE, EXTENDED RELEASE ORAL DAILY
Qty: 30 CAPSULE | Refills: 0 | Status: SHIPPED | OUTPATIENT
Start: 2023-09-19 | End: 2023-11-17

## 2023-09-28 ENCOUNTER — PATIENT OUTREACH (OUTPATIENT)
Dept: CARE COORDINATION | Facility: CLINIC | Age: 28
End: 2023-09-28
Payer: COMMERCIAL

## 2023-10-12 ENCOUNTER — PATIENT OUTREACH (OUTPATIENT)
Dept: CARE COORDINATION | Facility: CLINIC | Age: 28
End: 2023-10-12
Payer: COMMERCIAL

## 2023-10-19 DIAGNOSIS — F90.0 ATTENTION DEFICIT HYPERACTIVITY DISORDER (ADHD), PREDOMINANTLY INATTENTIVE TYPE: ICD-10-CM

## 2023-10-19 RX ORDER — DEXTROAMPHETAMINE SACCHARATE, AMPHETAMINE ASPARTATE MONOHYDRATE, DEXTROAMPHETAMINE SULFATE AND AMPHETAMINE SULFATE 2.5; 2.5; 2.5; 2.5 MG/1; MG/1; MG/1; MG/1
10 CAPSULE, EXTENDED RELEASE ORAL DAILY
Qty: 30 CAPSULE | Refills: 0 | Status: SHIPPED | OUTPATIENT
Start: 2023-10-19 | End: 2023-12-18

## 2023-10-19 NOTE — TELEPHONE ENCOUNTER
Refill Request  Medication name: Pending Prescriptions:                       Disp   Refills    amphetamine-dextroamphetamine (ADDERALL X*30 cap*0            Sig: Take 1 capsule (10 mg) by mouth daily    Requested Pharmacy:  [facility]/PHARMACY #[id number] - [address]     *Pt is currently out of medication

## 2023-11-13 ENCOUNTER — TELEPHONE (OUTPATIENT)
Dept: INTERNAL MEDICINE | Facility: CLINIC | Age: 28
End: 2023-11-13
Payer: COMMERCIAL

## 2023-11-13 NOTE — TELEPHONE ENCOUNTER
"Inhaler was not prescribed by PCP or discussed in previous visits with PCP.    Patient stated he is not currently having any difficulty breathing, but did develop a mild cough while deer hunting and states the \"inhaler helps\".  Patient is not having any other symptoms.  Patient will return call to clinic and make an appointment with PCP.  "

## 2023-11-13 NOTE — TELEPHONE ENCOUNTER
General Call      Reason for Call:  pt stating back in June or July was seen in ER for Bronchitis was given an Inhaler Albuterol - stating would like a refill as he is having a cough again    Pharm: [facility] [address] [facility]    What are your questions or concerns:  n/a    Date of last appointment with provider: n/a    Okay to leave a detailed message?: Yes at Cell number on file:    Telephone Information:   Mobile [phone]

## 2023-11-17 DIAGNOSIS — F90.0 ATTENTION DEFICIT HYPERACTIVITY DISORDER (ADHD), PREDOMINANTLY INATTENTIVE TYPE: ICD-10-CM

## 2023-11-17 RX ORDER — DEXTROAMPHETAMINE SACCHARATE, AMPHETAMINE ASPARTATE MONOHYDRATE, DEXTROAMPHETAMINE SULFATE AND AMPHETAMINE SULFATE 2.5; 2.5; 2.5; 2.5 MG/1; MG/1; MG/1; MG/1
10 CAPSULE, EXTENDED RELEASE ORAL DAILY
Qty: 30 CAPSULE | Refills: 0 | Status: SHIPPED | OUTPATIENT
Start: 2023-11-17 | End: 2023-12-18

## 2023-12-18 DIAGNOSIS — F90.0 ATTENTION DEFICIT HYPERACTIVITY DISORDER (ADHD), PREDOMINANTLY INATTENTIVE TYPE: ICD-10-CM

## 2023-12-18 RX ORDER — DEXTROAMPHETAMINE SACCHARATE, AMPHETAMINE ASPARTATE MONOHYDRATE, DEXTROAMPHETAMINE SULFATE AND AMPHETAMINE SULFATE 2.5; 2.5; 2.5; 2.5 MG/1; MG/1; MG/1; MG/1
10 CAPSULE, EXTENDED RELEASE ORAL DAILY
Qty: 30 CAPSULE | Refills: 0 | Status: SHIPPED | OUTPATIENT
Start: 2023-12-18 | End: 2024-03-13

## 2023-12-18 RX ORDER — DEXTROAMPHETAMINE SACCHARATE, AMPHETAMINE ASPARTATE MONOHYDRATE, DEXTROAMPHETAMINE SULFATE AND AMPHETAMINE SULFATE 2.5; 2.5; 2.5; 2.5 MG/1; MG/1; MG/1; MG/1
10 CAPSULE, EXTENDED RELEASE ORAL DAILY
Qty: 30 CAPSULE | Refills: 0 | Status: SHIPPED | OUTPATIENT
Start: 2023-12-18 | End: 2024-01-15

## 2024-01-15 DIAGNOSIS — F90.0 ATTENTION DEFICIT HYPERACTIVITY DISORDER (ADHD), PREDOMINANTLY INATTENTIVE TYPE: ICD-10-CM

## 2024-01-15 RX ORDER — DEXTROAMPHETAMINE SACCHARATE, AMPHETAMINE ASPARTATE MONOHYDRATE, DEXTROAMPHETAMINE SULFATE AND AMPHETAMINE SULFATE 2.5; 2.5; 2.5; 2.5 MG/1; MG/1; MG/1; MG/1
10 CAPSULE, EXTENDED RELEASE ORAL DAILY
Qty: 30 CAPSULE | Refills: 0 | Status: SHIPPED | OUTPATIENT
Start: 2024-01-15 | End: 2024-05-02

## 2024-01-15 NOTE — TELEPHONE ENCOUNTER
Please contact patient and let him know that he is overdue for follow-up with Dr. Flynn.  He needs to make an appointment for a physical or at least a visit.

## 2024-01-23 ENCOUNTER — OFFICE VISIT (OUTPATIENT)
Dept: INTERNAL MEDICINE | Facility: CLINIC | Age: 29
End: 2024-01-23
Payer: COMMERCIAL

## 2024-01-23 VITALS
HEART RATE: 83 BPM | WEIGHT: 202.1 LBS | TEMPERATURE: 97.2 F | BODY MASS INDEX: 27.37 KG/M2 | OXYGEN SATURATION: 98 % | RESPIRATION RATE: 14 BRPM | DIASTOLIC BLOOD PRESSURE: 72 MMHG | HEIGHT: 72 IN | SYSTOLIC BLOOD PRESSURE: 124 MMHG

## 2024-01-23 DIAGNOSIS — F90.0 ATTENTION DEFICIT HYPERACTIVITY DISORDER (ADHD), PREDOMINANTLY INATTENTIVE TYPE: Primary | ICD-10-CM

## 2024-01-23 PROBLEM — J30.2 SEASONAL ALLERGIC RHINITIS: Status: ACTIVE | Noted: 2017-12-27

## 2024-01-23 PROCEDURE — 99213 OFFICE O/P EST LOW 20 MIN: CPT | Mod: 25 | Performed by: INTERNAL MEDICINE

## 2024-01-23 PROCEDURE — 99395 PREV VISIT EST AGE 18-39: CPT | Performed by: INTERNAL MEDICINE

## 2024-01-23 RX ORDER — DEXTROAMPHETAMINE SACCHARATE, AMPHETAMINE ASPARTATE MONOHYDRATE, DEXTROAMPHETAMINE SULFATE AND AMPHETAMINE SULFATE 2.5; 2.5; 2.5; 2.5 MG/1; MG/1; MG/1; MG/1
10 CAPSULE, EXTENDED RELEASE ORAL 2 TIMES DAILY
Qty: 60 CAPSULE | Refills: 0 | Status: SHIPPED | OUTPATIENT
Start: 2024-03-25 | End: 2024-04-24

## 2024-01-23 RX ORDER — DEXTROAMPHETAMINE SACCHARATE, AMPHETAMINE ASPARTATE MONOHYDRATE, DEXTROAMPHETAMINE SULFATE AND AMPHETAMINE SULFATE 2.5; 2.5; 2.5; 2.5 MG/1; MG/1; MG/1; MG/1
10 CAPSULE, EXTENDED RELEASE ORAL 2 TIMES DAILY
Qty: 60 CAPSULE | Refills: 0 | Status: SHIPPED | OUTPATIENT
Start: 2024-02-23 | End: 2024-03-14

## 2024-01-23 RX ORDER — DEXTROAMPHETAMINE SACCHARATE, AMPHETAMINE ASPARTATE MONOHYDRATE, DEXTROAMPHETAMINE SULFATE AND AMPHETAMINE SULFATE 2.5; 2.5; 2.5; 2.5 MG/1; MG/1; MG/1; MG/1
10 CAPSULE, EXTENDED RELEASE ORAL 2 TIMES DAILY
Qty: 60 CAPSULE | Refills: 0 | Status: SHIPPED | OUTPATIENT
Start: 2024-01-23 | End: 2024-02-22

## 2024-01-23 ASSESSMENT — ENCOUNTER SYMPTOMS
NAUSEA: 0
HEMATOCHEZIA: 0
FREQUENCY: 0
DIARRHEA: 0
CHILLS: 0
COUGH: 0
NERVOUS/ANXIOUS: 0
SORE THROAT: 0
HEARTBURN: 0
HEMATURIA: 0
EYE PAIN: 0
WEAKNESS: 0
FEVER: 0
PARESTHESIAS: 0
PALPITATIONS: 0
MYALGIAS: 0
DYSURIA: 0
CONSTIPATION: 0
ARTHRALGIAS: 0
JOINT SWELLING: 0
ABDOMINAL PAIN: 0
SHORTNESS OF BREATH: 0
DIZZINESS: 0
HEADACHES: 0

## 2024-01-23 NOTE — PROGRESS NOTES
Preventive Care Visit  [facility]  James Rich MD, Internal Medicine  Jan 23, 2024    SUBJECTIVE:   Agustín is a 28 year old, presenting for the following:  Annual Visit    HPI:  he has been well.  He does feel that he needs the adderall bid rather than every day.  He doesn't get side effects.  No other health issues. Working in sales, tolerates exercise well, no unusual dyspnea or cough, or bowel or bladder issues.          1/23/2024     8:00 AM   Additional Questions   Roomed by Juarez MEEK RN     Healthy Habits:     Getting at least 3 servings of Calcium per day:  Yes    Bi-annual eye exam:  NO    Dental care twice a year:  Yes    Sleep apnea or symptoms of sleep apnea:  None    Diet:  Regular (no restrictions)    Frequency of exercise:  2-3 days/week    Duration of exercise:  15-30 minutes    Taking medications regularly:  Yes    Medication side effects:  None    Additional concerns today:  No    Today's PHQ-2 Score:       1/23/2024     7:56 AM   PHQ-2 ( 1999 [employer])   Q1: Little interest or pleasure in doing things 0   Q2: Feeling down, depressed or hopeless 0   PHQ-2 Score 0   Q1: Little interest or pleasure in doing things Not at all   Q2: Feeling down, depressed or hopeless Not at all   PHQ-2 Score 0     Social History     Tobacco Use    Smoking status: Former    Smokeless tobacco: Former   Substance Use Topics    Alcohol use: Yes     Alcohol/week: 3.0 standard drinks of alcohol         1/23/2024     7:56 AM   Alcohol Use   Prescreen: >3 drinks/day or >7 drinks/week? No     Reviewed orders with patient. Reviewed health maintenance and updated orders accordingly - Yes    Reviewed and updated as needed this visit by clinical staff  Tobacco  Allergies  Meds            Reviewed and updated as needed this visit by Provider     Review of Systems   Constitutional:  Negative for chills and fever.   HENT:  Negative for congestion, ear pain, hearing loss and sore throat.    Eyes:  Negative for  "pain and visual disturbance.   Respiratory:  Negative for cough and shortness of breath.    Cardiovascular:  Negative for chest pain and palpitations.   Gastrointestinal:  Negative for abdominal pain, constipation, diarrhea and nausea.   Genitourinary:  Negative for dysuria, frequency, genital sores, hematuria, penile discharge and urgency.   Musculoskeletal:  Negative for arthralgias, joint swelling and myalgias.   Skin:  Negative for rash.   Neurological:  Negative for dizziness, weakness and headaches.   Psychiatric/Behavioral:  The patient is not nervous/anxious.      OBJECTIVE:     PHYSICAL EXAM:  General Appearance: In no acute distress  Vitals:    01/23/24 0801   BP: 124/72   BP Location: Left arm   Patient Position: Sitting   Cuff Size: Adult Regular   Pulse: 83   Resp: 14   Temp: 97.2  F (36.2  C)   TempSrc: Tympanic   SpO2: 98%   Weight: 91.7 kg (202 lb 1.6 oz)   Height: 1.839 m (6' 0.4\")     Estimated body mass index is 27.11 kg/m  as calculated from the following:    Height as of this encounter: 1.839 m (6' 0.4\").    Weight as of this encounter: 91.7 kg (202 lb 1.6 oz).    HEENT: nose and throat clear, ears normal   NECK:  without cervical or axillary adenopathy  RESPIRATORY: Clear   CARDIOVASCULAR: S1, S2  ABDOMEN: soft, flat, and non-tender  EXTREMITIES:  no significant inflammation or edema  NEUROLOGIC: Speech is clear, gait is normal      ASSESSMENT/PLAN:   Attention deficit hyperactivity disorder (ADHD), predominantly inattentive type  Continue treatment with increased dose of BID  - amphetamine-dextroamphetamine (ADDERALL XR) 10 MG 24 hr capsule  Dispense: 60 capsule; Refill: 0  - amphetamine-dextroamphetamine (ADDERALL XR) 10 MG 24 hr capsule  Dispense: 60 capsule; Refill: 0  - amphetamine-dextroamphetamine (ADDERALL XR) 10 MG 24 hr capsule  Dispense: 60 capsule; Refill: 0    Counseling  Reviewed preventive health counseling, as reflected in patient instructions       Regular exercise       " "Healthy diet/nutrition    BMI  Estimated body mass index is 27.11 kg/m  as calculated from the following:    Height as of this encounter: 1.839 m (6' 0.4\").    Weight as of this encounter: 91.7 kg (202 lb 1.6 oz).     He reports that he has quit smoking. He has quit using smokeless tobacco.    Signed Electronically by: James Rich MD  Answers submitted by the patient for this visit:  Annual Preventive Visit (Submitted on 1/23/2024)  Chief Complaint: Annual Exam:  Blood in stool: No  heartburn: No  peripheral edema: No  mood changes: No  Skin sensation changes: No  impotence: No    "

## 2024-02-07 ENCOUNTER — TELEPHONE (OUTPATIENT)
Dept: INTERNAL MEDICINE | Facility: CLINIC | Age: 29
End: 2024-02-07
Payer: COMMERCIAL

## 2024-02-07 NOTE — TELEPHONE ENCOUNTER
General Call    Contacts         Type Contact Phone/Fax    02/07/2024 11:42 AM CST Phone (Incoming) Agustín Conway (Self) [phone] (M)          Reason for Call:  Controlled Substance - Authorization Dispense Early    What are your questions or concerns:  Patient is at pharmacy seeking early refill due to travel.  Does not know dates of travel     amphetamine-dextroamphetamine (ADDERALL XR) 10 MG 24 hr capsule     Date of last appointment with provider: 01/23/2024 with Dr. Rich

## 2024-02-07 NOTE — TELEPHONE ENCOUNTER
General Call      Reason for Call:  Pt would like to discuss his Adderall  Rx    Please advise    What are your questions or concerns:  n/a    Date of last appointment with provider: n/a    Okay to leave a detailed message?: Yes at Cell number on file:    Telephone Information:   Mobile [phone]

## 2024-03-13 ENCOUNTER — TELEPHONE (OUTPATIENT)
Dept: INTERNAL MEDICINE | Facility: CLINIC | Age: 29
End: 2024-03-13
Payer: COMMERCIAL

## 2024-03-13 DIAGNOSIS — F90.0 ATTENTION DEFICIT HYPERACTIVITY DISORDER (ADHD), PREDOMINANTLY INATTENTIVE TYPE: ICD-10-CM

## 2024-03-14 DIAGNOSIS — F90.0 ATTENTION DEFICIT HYPERACTIVITY DISORDER (ADHD), PREDOMINANTLY INATTENTIVE TYPE: ICD-10-CM

## 2024-03-14 RX ORDER — DEXTROAMPHETAMINE SACCHARATE, AMPHETAMINE ASPARTATE MONOHYDRATE, DEXTROAMPHETAMINE SULFATE AND AMPHETAMINE SULFATE 2.5; 2.5; 2.5; 2.5 MG/1; MG/1; MG/1; MG/1
10 CAPSULE, EXTENDED RELEASE ORAL DAILY
Qty: 30 CAPSULE | Refills: 0 | Status: SHIPPED | OUTPATIENT
Start: 2024-03-14 | End: 2024-05-06

## 2024-03-14 NOTE — TELEPHONE ENCOUNTER
Catheter removed. Pt voided. Post void bladder scan 25ml.   Pt is supposed to have a 60 day fill not 30 please send another 30 day fill to pharmacy

## 2024-03-15 ENCOUNTER — TELEPHONE (OUTPATIENT)
Dept: INTERNAL MEDICINE | Facility: CLINIC | Age: 29
End: 2024-03-15
Payer: COMMERCIAL

## 2024-03-15 RX ORDER — DEXTROAMPHETAMINE SACCHARATE, AMPHETAMINE ASPARTATE MONOHYDRATE, DEXTROAMPHETAMINE SULFATE AND AMPHETAMINE SULFATE 2.5; 2.5; 2.5; 2.5 MG/1; MG/1; MG/1; MG/1
10 CAPSULE, EXTENDED RELEASE ORAL 2 TIMES DAILY
Qty: 60 CAPSULE | Refills: 0 | Status: SHIPPED | OUTPATIENT
Start: 2024-03-15 | End: 2024-05-06

## 2024-03-15 NOTE — TELEPHONE ENCOUNTER
General Call      Reason for Call:  pt stating pharmacy indicated they cannot release the Adderall 10mg #30 because it was to early    Pt requesting PCP to call pharmacy and ask these to be released    What are your questions or concerns:  n/a    Date of last appointment with provider: n/a    Okay to leave a detailed message?: Yes at Cell number on file:    Telephone Information:   Mobile [phone]

## 2024-03-18 NOTE — TELEPHONE ENCOUNTER
Spoke with pharmacy who states the patient picked up #30 on 3/14. Pharmacy also stated that a new prescription was sent in for #60 with instructions to take 2 per day.    Pharmacy states if he takes 2 per day he would be able to fill on 3/28. If he takes once per day, he would not be able to fill until 4/11.    Attempted to contact patient to clarify how he takes his Adderall. No answer. Left message to call clinic.

## 2024-03-18 NOTE — TELEPHONE ENCOUNTER
Spoke with patient and relayed information below. Patient states he does take his Adderall two times per day. Instructed patient on when he will be able to  his prescription based on this information. He verbalized understanding and has no further questions.

## 2024-05-02 DIAGNOSIS — F90.0 ATTENTION DEFICIT HYPERACTIVITY DISORDER (ADHD), PREDOMINANTLY INATTENTIVE TYPE: ICD-10-CM

## 2024-05-02 RX ORDER — DEXTROAMPHETAMINE SACCHARATE, AMPHETAMINE ASPARTATE MONOHYDRATE, DEXTROAMPHETAMINE SULFATE AND AMPHETAMINE SULFATE 2.5; 2.5; 2.5; 2.5 MG/1; MG/1; MG/1; MG/1
10 CAPSULE, EXTENDED RELEASE ORAL DAILY
Qty: 30 CAPSULE | Refills: 0 | Status: SHIPPED | OUTPATIENT
Start: 2024-05-02 | End: 2024-05-06

## 2024-05-03 ENCOUNTER — NURSE TRIAGE (OUTPATIENT)
Dept: NURSING | Facility: CLINIC | Age: 29
End: 2024-05-03
Payer: COMMERCIAL

## 2024-05-03 DIAGNOSIS — F90.0 ATTENTION DEFICIT HYPERACTIVITY DISORDER (ADHD), PREDOMINANTLY INATTENTIVE TYPE: ICD-10-CM

## 2024-05-03 NOTE — TELEPHONE ENCOUNTER
Patient does take Adderall XR 10 mg capsule 2 times daily.  The rx sent in on 5/2 sig was 1 capsule 10 mg by mouth daily.  The last refill in March was for 2 times daily and has not had an adjustment since then.  MELANIE GranadosN RN  [facility]

## 2024-05-06 RX ORDER — DEXTROAMPHETAMINE SACCHARATE, AMPHETAMINE ASPARTATE MONOHYDRATE, DEXTROAMPHETAMINE SULFATE AND AMPHETAMINE SULFATE 2.5; 2.5; 2.5; 2.5 MG/1; MG/1; MG/1; MG/1
10 CAPSULE, EXTENDED RELEASE ORAL 2 TIMES DAILY
Qty: 60 CAPSULE | Refills: 0 | Status: SHIPPED | OUTPATIENT
Start: 2024-05-06 | End: 2024-07-01

## 2024-07-01 DIAGNOSIS — F90.0 ATTENTION DEFICIT HYPERACTIVITY DISORDER (ADHD), PREDOMINANTLY INATTENTIVE TYPE: ICD-10-CM

## 2024-07-02 RX ORDER — DEXTROAMPHETAMINE SACCHARATE, AMPHETAMINE ASPARTATE MONOHYDRATE, DEXTROAMPHETAMINE SULFATE AND AMPHETAMINE SULFATE 2.5; 2.5; 2.5; 2.5 MG/1; MG/1; MG/1; MG/1
10 CAPSULE, EXTENDED RELEASE ORAL 2 TIMES DAILY
Qty: 60 CAPSULE | Refills: 0 | Status: SHIPPED | OUTPATIENT
Start: 2024-07-02 | End: 2024-08-06

## 2024-08-06 DIAGNOSIS — F90.0 ATTENTION DEFICIT HYPERACTIVITY DISORDER (ADHD), PREDOMINANTLY INATTENTIVE TYPE: ICD-10-CM

## 2024-08-06 RX ORDER — DEXTROAMPHETAMINE SACCHARATE, AMPHETAMINE ASPARTATE MONOHYDRATE, DEXTROAMPHETAMINE SULFATE AND AMPHETAMINE SULFATE 2.5; 2.5; 2.5; 2.5 MG/1; MG/1; MG/1; MG/1
10 CAPSULE, EXTENDED RELEASE ORAL 2 TIMES DAILY
Qty: 60 CAPSULE | Refills: 0 | Status: SHIPPED | OUTPATIENT
Start: 2024-08-06 | End: 2024-09-13

## 2024-09-13 DIAGNOSIS — F90.0 ATTENTION DEFICIT HYPERACTIVITY DISORDER (ADHD), PREDOMINANTLY INATTENTIVE TYPE: ICD-10-CM

## 2024-09-13 RX ORDER — DEXTROAMPHETAMINE SACCHARATE, AMPHETAMINE ASPARTATE MONOHYDRATE, DEXTROAMPHETAMINE SULFATE AND AMPHETAMINE SULFATE 2.5; 2.5; 2.5; 2.5 MG/1; MG/1; MG/1; MG/1
10 CAPSULE, EXTENDED RELEASE ORAL 2 TIMES DAILY
Qty: 60 CAPSULE | Refills: 0 | Status: SHIPPED | OUTPATIENT
Start: 2024-09-13 | End: 2024-09-19

## 2024-09-13 NOTE — TELEPHONE ENCOUNTER
Medication Question or Refill    Contacts       Contact Date/Time Type Contact Phone/Fax    09/13/2024 11:03 AM CDT Phone (Incoming) Agustín Conway (Self) [phone] (M)            What medication are you calling about (include dose and sig)?: amphetamine-dextroamphetamine (ADDERALL XR) 10 MG 24 hr capsule     Preferred Pharmacy:   [facility]/pharmacy #[id number] - [address] [address]  Phone: [phone] Fax: [phone]      Controlled Substance Agreement on file:   CSA -- Patient Level:    CSA: None found at the patient level.       Who prescribed the medication?: pcp    Do you need a refill? Yes    When did you use the medication last? today    Patient offered an appointment? No    Do you have any questions or concerns?  No      Could we send this information to you in [facility] or would you prefer to receive a phone call?:   Patient would prefer a phone call   Okay to leave a detailed message?: Yes at Cell number on file:    Telephone Information:   Mobile [phone]

## 2024-09-19 ENCOUNTER — TELEPHONE (OUTPATIENT)
Dept: INTERNAL MEDICINE | Facility: CLINIC | Age: 29
End: 2024-09-19
Payer: COMMERCIAL

## 2024-09-19 DIAGNOSIS — F90.0 ATTENTION DEFICIT HYPERACTIVITY DISORDER (ADHD), PREDOMINANTLY INATTENTIVE TYPE: ICD-10-CM

## 2024-09-19 NOTE — TELEPHONE ENCOUNTER
Medication Question or Refill    Contacts       Contact Date/Time Type Contact Phone/Fax    09/19/2024 01:15 PM CDT Phone (Incoming) Agustín Conway (Self) [phone] (M)            What medication are you calling about (include dose and sig)?: Adderall 10 MG 24 hr capsule    Preferred Pharmacy:  [facility] Lq[facility]t-[phone]      Controlled Substance Agreement on file:   CSA -- Patient Level:    CSA: None found at the patient level.       Who prescribed the medication?: Dr. Flynn  Do you need a refill? Yes      Do you have any questions or concerns?  Yes only has brand name not generic      Could we send this information to you in [employer] or would you prefer to receive a phone call?:   Patient would prefer a phone call   Okay to leave a detailed message?Yes- [phone]

## 2024-09-19 NOTE — TELEPHONE ENCOUNTER
amphetamine-dextroamphetamine (ADDERALL XR) 10 MG 24 hr capsule 60 capsule 0 9/13/2024 -- No   Sig - Route: Take 1 capsule (10 mg) by mouth 2 times daily. - Oral     Please send to new pharmacy as the [facility] does not have this medication - please make sure it states okay to get name brand as [facility] only has that in stock

## 2024-09-20 RX ORDER — DEXTROAMPHETAMINE SACCHARATE, AMPHETAMINE ASPARTATE MONOHYDRATE, DEXTROAMPHETAMINE SULFATE AND AMPHETAMINE SULFATE 2.5; 2.5; 2.5; 2.5 MG/1; MG/1; MG/1; MG/1
10 CAPSULE, EXTENDED RELEASE ORAL 2 TIMES DAILY
Qty: 60 CAPSULE | Refills: 0 | Status: SHIPPED | OUTPATIENT
Start: 2024-09-20 | End: 2024-09-26

## 2024-09-26 ENCOUNTER — VIRTUAL VISIT (OUTPATIENT)
Dept: INTERNAL MEDICINE | Facility: CLINIC | Age: 29
End: 2024-09-26
Payer: COMMERCIAL

## 2024-09-26 DIAGNOSIS — F90.0 ATTENTION DEFICIT HYPERACTIVITY DISORDER (ADHD), PREDOMINANTLY INATTENTIVE TYPE: ICD-10-CM

## 2024-09-26 PROCEDURE — G2211 COMPLEX E/M VISIT ADD ON: HCPCS | Mod: 95 | Performed by: INTERNAL MEDICINE

## 2024-09-26 PROCEDURE — 99214 OFFICE O/P EST MOD 30 MIN: CPT | Mod: 95 | Performed by: INTERNAL MEDICINE

## 2024-09-26 RX ORDER — DEXTROAMPHETAMINE SACCHARATE, AMPHETAMINE ASPARTATE MONOHYDRATE, DEXTROAMPHETAMINE SULFATE AND AMPHETAMINE SULFATE 2.5; 2.5; 2.5; 2.5 MG/1; MG/1; MG/1; MG/1
10 CAPSULE, EXTENDED RELEASE ORAL 2 TIMES DAILY
Qty: 60 CAPSULE | Refills: 0 | Status: SHIPPED | OUTPATIENT
Start: 2024-09-26 | End: 2024-09-26

## 2024-09-26 RX ORDER — DEXTROAMPHETAMINE SACCHARATE, AMPHETAMINE ASPARTATE MONOHYDRATE, DEXTROAMPHETAMINE SULFATE AND AMPHETAMINE SULFATE 2.5; 2.5; 2.5; 2.5 MG/1; MG/1; MG/1; MG/1
10 CAPSULE, EXTENDED RELEASE ORAL 2 TIMES DAILY
Qty: 180 CAPSULE | Refills: 0 | Status: SHIPPED | OUTPATIENT
Start: 2024-09-26 | End: 2024-09-26

## 2024-09-26 RX ORDER — DEXTROAMPHETAMINE SACCHARATE, AMPHETAMINE ASPARTATE, DEXTROAMPHETAMINE SULFATE AND AMPHETAMINE SULFATE 1.25; 1.25; 1.25; 1.25 MG/1; MG/1; MG/1; MG/1
5 TABLET ORAL
Qty: 90 TABLET | Refills: 0 | Status: SHIPPED | OUTPATIENT
Start: 2024-09-26

## 2024-09-26 RX ORDER — DEXTROAMPHETAMINE SACCHARATE, AMPHETAMINE ASPARTATE MONOHYDRATE, DEXTROAMPHETAMINE SULFATE AND AMPHETAMINE SULFATE 2.5; 2.5; 2.5; 2.5 MG/1; MG/1; MG/1; MG/1
10 CAPSULE, EXTENDED RELEASE ORAL EVERY MORNING
Qty: 90 CAPSULE | Refills: 0 | Status: SHIPPED | OUTPATIENT
Start: 2024-09-26

## 2024-09-26 NOTE — PROGRESS NOTES
"Agustín is a 28 year old who is being evaluated via a billable video visit.    How would you like to obtain your AVS? MyChart  Will anyone else be joining your video visit? No      Assessment & Plan     1. Attention deficit hyperactivity disorder (ADHD), predominantly inattentive type  Having anxiety and currently sleeping with the added extended release 10 mg tab in the afternoon.  Therefore, he will continue 10 mg extended release in the morning and take 5 mg immediate release in the afternoon.  I did call the pharmacy and they have these in stock and clarified the instructions.  - amphetamine-dextroamphetamine (ADDERALL XR) 10 MG 24 hr capsule; Take 1 capsule (10 mg) by mouth every morning.  Dispense: 90 capsule; Refill: 0  - amphetamine-dextroamphetamine (ADDERALL) 5 MG tablet; Take 1 tablet (5 mg) by mouth daily (with lunch).  Dispense: 90 tablet; Refill: 0    The longitudinal plan of care for the diagnosis(es)/condition(s) as documented were addressed during this visit. Due to the added complexity in care, I will continue to support Agustín in the subsequent management and with ongoing continuity of care.    BMI  Estimated body mass index is 27.11 kg/m  as calculated from the following:    Height as of 1/23/24: 1.839 m (6' 0.4\").    Weight as of 1/23/24: 91.7 kg (202 lb 1.6 oz).       Subjective   Agustín is a 28 year old, presenting for the following health issues:  Recheck Medication (Out of adderrall refills x 1 week)      9/26/2024    11:19 AM   Additional Questions   Roomed by FUAD Grant     History of Present Illness       Reason for visit:  Med check   He is taking medications regularly.           Objective           Vitals:  No vitals were obtained today due to virtual visit.    Physical Exam         Video-Visit Details    Type of service:  Video Visit   Originating Location (pt. Location): Home    Distant Location (provider location):  On-site  Platform used for Video Visit: Guanaco  Signed Electronically by: " Dick Flynn MD

## 2024-10-25 DIAGNOSIS — F90.0 ATTENTION DEFICIT HYPERACTIVITY DISORDER (ADHD), PREDOMINANTLY INATTENTIVE TYPE: ICD-10-CM

## 2024-10-25 RX ORDER — DEXTROAMPHETAMINE SACCHARATE, AMPHETAMINE ASPARTATE MONOHYDRATE, DEXTROAMPHETAMINE SULFATE AND AMPHETAMINE SULFATE 2.5; 2.5; 2.5; 2.5 MG/1; MG/1; MG/1; MG/1
10 CAPSULE, EXTENDED RELEASE ORAL EVERY MORNING
Qty: 90 CAPSULE | Refills: 0 | Status: SHIPPED | OUTPATIENT
Start: 2024-10-25

## 2024-10-25 RX ORDER — DEXTROAMPHETAMINE SACCHARATE, AMPHETAMINE ASPARTATE, DEXTROAMPHETAMINE SULFATE AND AMPHETAMINE SULFATE 1.25; 1.25; 1.25; 1.25 MG/1; MG/1; MG/1; MG/1
5 TABLET ORAL
Qty: 90 TABLET | Refills: 0 | Status: SHIPPED | OUTPATIENT
Start: 2024-10-25

## 2024-11-27 DIAGNOSIS — F90.0 ATTENTION DEFICIT HYPERACTIVITY DISORDER (ADHD), PREDOMINANTLY INATTENTIVE TYPE: ICD-10-CM

## 2024-11-27 RX ORDER — DEXTROAMPHETAMINE SACCHARATE, AMPHETAMINE ASPARTATE, DEXTROAMPHETAMINE SULFATE AND AMPHETAMINE SULFATE 1.25; 1.25; 1.25; 1.25 MG/1; MG/1; MG/1; MG/1
5 TABLET ORAL
Qty: 90 TABLET | Refills: 0 | Status: SHIPPED | OUTPATIENT
Start: 2024-11-27

## 2024-11-27 RX ORDER — DEXTROAMPHETAMINE SACCHARATE, AMPHETAMINE ASPARTATE MONOHYDRATE, DEXTROAMPHETAMINE SULFATE AND AMPHETAMINE SULFATE 2.5; 2.5; 2.5; 2.5 MG/1; MG/1; MG/1; MG/1
10 CAPSULE, EXTENDED RELEASE ORAL EVERY MORNING
Qty: 90 CAPSULE | Refills: 0 | Status: SHIPPED | OUTPATIENT
Start: 2024-11-27

## 2024-12-26 ENCOUNTER — PATIENT OUTREACH (OUTPATIENT)
Dept: CARE COORDINATION | Facility: CLINIC | Age: 29
End: 2024-12-26
Payer: COMMERCIAL

## 2024-12-30 DIAGNOSIS — F90.0 ATTENTION DEFICIT HYPERACTIVITY DISORDER (ADHD), PREDOMINANTLY INATTENTIVE TYPE: ICD-10-CM

## 2024-12-30 RX ORDER — DEXTROAMPHETAMINE SACCHARATE, AMPHETAMINE ASPARTATE MONOHYDRATE, DEXTROAMPHETAMINE SULFATE AND AMPHETAMINE SULFATE 2.5; 2.5; 2.5; 2.5 MG/1; MG/1; MG/1; MG/1
10 CAPSULE, EXTENDED RELEASE ORAL EVERY MORNING
Qty: 90 CAPSULE | Refills: 0 | Status: SHIPPED | OUTPATIENT
Start: 2024-12-30

## 2024-12-30 RX ORDER — DEXTROAMPHETAMINE SACCHARATE, AMPHETAMINE ASPARTATE, DEXTROAMPHETAMINE SULFATE AND AMPHETAMINE SULFATE 1.25; 1.25; 1.25; 1.25 MG/1; MG/1; MG/1; MG/1
5 TABLET ORAL
Qty: 90 TABLET | Refills: 0 | Status: SHIPPED | OUTPATIENT
Start: 2024-12-30

## 2025-01-09 ENCOUNTER — PATIENT OUTREACH (OUTPATIENT)
Dept: CARE COORDINATION | Facility: CLINIC | Age: 30
End: 2025-01-09
Payer: COMMERCIAL

## 2025-02-03 DIAGNOSIS — F90.0 ATTENTION DEFICIT HYPERACTIVITY DISORDER (ADHD), PREDOMINANTLY INATTENTIVE TYPE: ICD-10-CM

## 2025-02-03 RX ORDER — DEXTROAMPHETAMINE SACCHARATE, AMPHETAMINE ASPARTATE, DEXTROAMPHETAMINE SULFATE AND AMPHETAMINE SULFATE 1.25; 1.25; 1.25; 1.25 MG/1; MG/1; MG/1; MG/1
5 TABLET ORAL
Qty: 90 TABLET | Refills: 0 | Status: SHIPPED | OUTPATIENT
Start: 2025-02-03

## 2025-02-03 RX ORDER — DEXTROAMPHETAMINE SACCHARATE, AMPHETAMINE ASPARTATE MONOHYDRATE, DEXTROAMPHETAMINE SULFATE AND AMPHETAMINE SULFATE 2.5; 2.5; 2.5; 2.5 MG/1; MG/1; MG/1; MG/1
10 CAPSULE, EXTENDED RELEASE ORAL EVERY MORNING
Qty: 90 CAPSULE | Refills: 0 | Status: SHIPPED | OUTPATIENT
Start: 2025-02-03

## 2025-03-02 ENCOUNTER — HEALTH MAINTENANCE LETTER (OUTPATIENT)
Age: 30
End: 2025-03-02

## 2025-03-04 ENCOUNTER — OFFICE VISIT (OUTPATIENT)
Dept: URGENT CARE | Facility: URGENT CARE | Age: 30
End: 2025-03-04
Payer: COMMERCIAL

## 2025-03-04 VITALS
DIASTOLIC BLOOD PRESSURE: 81 MMHG | HEART RATE: 85 BPM | BODY MASS INDEX: 26.83 KG/M2 | OXYGEN SATURATION: 100 % | TEMPERATURE: 98.2 F | SYSTOLIC BLOOD PRESSURE: 126 MMHG | RESPIRATION RATE: 16 BRPM | WEIGHT: 200 LBS

## 2025-03-04 DIAGNOSIS — J01.00 ACUTE NON-RECURRENT MAXILLARY SINUSITIS: Primary | ICD-10-CM

## 2025-03-04 PROCEDURE — 3074F SYST BP LT 130 MM HG: CPT | Performed by: PHYSICIAN ASSISTANT

## 2025-03-04 PROCEDURE — 99213 OFFICE O/P EST LOW 20 MIN: CPT | Performed by: PHYSICIAN ASSISTANT

## 2025-03-04 PROCEDURE — 3079F DIAST BP 80-89 MM HG: CPT | Performed by: PHYSICIAN ASSISTANT

## 2025-03-04 RX ORDER — DOXYCYCLINE 100 MG/1
100 CAPSULE ORAL 2 TIMES DAILY
Qty: 20 CAPSULE | Refills: 0 | Status: SHIPPED | OUTPATIENT
Start: 2025-03-04 | End: 2025-03-14

## 2025-03-05 DIAGNOSIS — F90.0 ATTENTION DEFICIT HYPERACTIVITY DISORDER (ADHD), PREDOMINANTLY INATTENTIVE TYPE: ICD-10-CM

## 2025-03-05 RX ORDER — DEXTROAMPHETAMINE SACCHARATE, AMPHETAMINE ASPARTATE, DEXTROAMPHETAMINE SULFATE AND AMPHETAMINE SULFATE 1.25; 1.25; 1.25; 1.25 MG/1; MG/1; MG/1; MG/1
5 TABLET ORAL
Qty: 90 TABLET | Refills: 0 | Status: SHIPPED | OUTPATIENT
Start: 2025-03-05

## 2025-03-05 RX ORDER — DEXTROAMPHETAMINE SACCHARATE, AMPHETAMINE ASPARTATE MONOHYDRATE, DEXTROAMPHETAMINE SULFATE AND AMPHETAMINE SULFATE 2.5; 2.5; 2.5; 2.5 MG/1; MG/1; MG/1; MG/1
10 CAPSULE, EXTENDED RELEASE ORAL EVERY MORNING
Qty: 90 CAPSULE | Refills: 0 | Status: SHIPPED | OUTPATIENT
Start: 2025-03-05

## 2025-03-05 NOTE — PATIENT INSTRUCTIONS
1.  Take antibiotic according to bottle instructions with food to avoid stomach upset.    2.  I recommend using Mucinex to help thin mucus secretions.  Continue Flonase and Zyrtec.   3.  Take Advil or Tylenol as needed for pain control.  4.  Follow-up if you not having any improvement in your symptoms over the next 5 days.

## 2025-03-05 NOTE — PROGRESS NOTES
[facility] [address] [address]  Phone: [phone]  Fax: [phone]    Patient:  Agustín Conway, Date of birth 1995  Date of Visit:  03/04/2025  Referring Provider No ref. provider found    Patient presents with:  Sinus Problem: Sx for 5-6 week. Lots of pressure. Sore throat in morning. Cough. Plugged ear. Fatigue.        ICD-10-CM    1. Acute non-recurrent maxillary sinusitis  J01.00 doxycycline hyclate (VIBRAMYCIN) 100 MG capsule          Patient Instructions   1.  Take antibiotic according to bottle instructions with food to avoid stomach upset.    2.  I recommend using Mucinex to help thin mucus secretions.  Continue Flonase and Zyrtec.   3.  Take Advil or Tylenol as needed for pain control.  4.  Follow-up if you not having any improvement in your symptoms over the next 5 days.      Assessment & Plan      Acute non-recurrent maxillary sinusitis  - Symptoms consistent with a sinus infection, including sinus pressure, congestion, sore throat, and plugged ears. No signs of ear infection or pneumonia.  - Prescribe Doxycycline, to be taken as a capsule twice a day for 10 days. Stop using Zicam nasal spray and any supplements containing zinc, calcium, iron, or magnesium during the course of Doxycycline. Prescription sent electronically to the pharmacy.  - Risks and side effects: Doxycycline may increase susceptibility to sunburn.         History of Present Illness     Pertinent history obtain from: patient    Agustín Conway, a 29-year-old male, has been experiencing symptoms for approximately six weeks. He reports significant sinus pressure and congestion, which he believes began as a cold. The symptoms persisted after a business trip where he was unable to clear his sinuses effectively. He experiences a scratchy sore throat every morning, a cough, plugged ears, and sinus pain. He has been testing regularly for fever but has not had one. Over-the-counter  treatments, including two boxes of Emergen-C, Zyrtec, Flonase nasal spray, Zicam, and ibuprofen, have provided little relief. He notes that the sinus pressure is slightly more pronounced on one side. He has not had any nosebleeds, but he has had some blood in his mucous. Agustín has not missed work due to these symptoms but finds it difficult to focus.    Problem List:  2017-12: Seasonal allergic rhinitis      Past Medical History:   Diagnosis Date    ADHD (attention deficit hyperactivity disorder)     Tinea cruris        Social History     Tobacco Use    Smoking status: Former    Smokeless tobacco: Former   Substance Use Topics    Alcohol use: Yes     Alcohol/week: 3.0 standard drinks of alcohol       Physical Exam     Physical Exam  Vitals and nursing note reviewed.   Constitutional:       General: He is not in acute distress.     Appearance: He is not toxic-appearing or diaphoretic.   HENT:      Head: Normocephalic and atraumatic.      Right Ear: Tympanic membrane, ear canal and external ear normal.      Left Ear: Tympanic membrane, ear canal and external ear normal.      Mouth/Throat:      Mouth: Mucous membranes are moist.      Pharynx: No oropharyngeal exudate or posterior oropharyngeal erythema.   Eyes:      Conjunctiva/sclera: Conjunctivae normal.   Cardiovascular:      Rate and Rhythm: Normal rate and regular rhythm.   Pulmonary:      Effort: Pulmonary effort is normal. No respiratory distress.      Breath sounds: Normal breath sounds. No stridor. No wheezing, rhonchi or rales.   Neurological:      Mental Status: He is alert.   Psychiatric:         Mood and Affect: Mood normal.         Behavior: Behavior normal.         Thought Content: Thought content normal.         Judgment: Judgment normal.         Vital signs:  /81   Pulse 85   Temp 98.2  F (36.8  C) (Oral)   Resp 16   Wt 90.7 kg (200 lb)   SpO2 100%   BMI 26.83 kg/m               Consent was obtained from the patient to use an AI  documentation tool in the creation of  this note

## 2025-04-15 DIAGNOSIS — F90.0 ATTENTION DEFICIT HYPERACTIVITY DISORDER (ADHD), PREDOMINANTLY INATTENTIVE TYPE: ICD-10-CM

## 2025-04-15 RX ORDER — DEXTROAMPHETAMINE SACCHARATE, AMPHETAMINE ASPARTATE, DEXTROAMPHETAMINE SULFATE AND AMPHETAMINE SULFATE 1.25; 1.25; 1.25; 1.25 MG/1; MG/1; MG/1; MG/1
5 TABLET ORAL
Qty: 90 TABLET | Refills: 0 | Status: SHIPPED | OUTPATIENT
Start: 2025-04-15

## 2025-04-15 RX ORDER — DEXTROAMPHETAMINE SACCHARATE, AMPHETAMINE ASPARTATE MONOHYDRATE, DEXTROAMPHETAMINE SULFATE AND AMPHETAMINE SULFATE 2.5; 2.5; 2.5; 2.5 MG/1; MG/1; MG/1; MG/1
10 CAPSULE, EXTENDED RELEASE ORAL EVERY MORNING
Qty: 90 CAPSULE | Refills: 0 | Status: SHIPPED | OUTPATIENT
Start: 2025-04-15

## 2025-04-15 NOTE — TELEPHONE ENCOUNTER
Medication Question or Refill        What medication are you calling about (include dose and sig)?: amphetamine-dextroamphetamine (ADDERALL) 5 MG tablet     amphetamine-dextroamphetamine (ADDERALL XR) 10 MG 24 hr capsule       Preferred Pharmacy:       [address]  [address] [facility] [address]  [facility] [address]  Phone: [phone] Fax: [phone]          Controlled Substance Agreement on file:   CSA -- Patient Level:    CSA: None found at the patient level.       Who prescribed the medication?: hauth    Do you need a refill? Yes    When did you use the medication last? Today [id number].     Patient offered an appointment? No    Do you have any questions or concerns?  Yes: has 1 pill left. Would like refill asap      Could we send this information to you in [facility] or would you prefer to receive a phone call?:   Patient would prefer a phone call   Okay to leave a detailed message?: Yes at Cell number on file:    Telephone Information:   Mobile [phone]

## 2025-06-26 DIAGNOSIS — F90.0 ATTENTION DEFICIT HYPERACTIVITY DISORDER (ADHD), PREDOMINANTLY INATTENTIVE TYPE: ICD-10-CM

## 2025-06-26 RX ORDER — DEXTROAMPHETAMINE SACCHARATE, AMPHETAMINE ASPARTATE, DEXTROAMPHETAMINE SULFATE AND AMPHETAMINE SULFATE 1.25; 1.25; 1.25; 1.25 MG/1; MG/1; MG/1; MG/1
5 TABLET ORAL
Qty: 90 TABLET | Refills: 0 | Status: SHIPPED | OUTPATIENT
Start: 2025-06-26

## 2025-06-26 RX ORDER — DEXTROAMPHETAMINE SACCHARATE, AMPHETAMINE ASPARTATE MONOHYDRATE, DEXTROAMPHETAMINE SULFATE AND AMPHETAMINE SULFATE 2.5; 2.5; 2.5; 2.5 MG/1; MG/1; MG/1; MG/1
10 CAPSULE, EXTENDED RELEASE ORAL EVERY MORNING
Qty: 90 CAPSULE | Refills: 0 | Status: SHIPPED | OUTPATIENT
Start: 2025-06-26

## 2025-07-22 ENCOUNTER — PATIENT OUTREACH (OUTPATIENT)
Dept: CARE COORDINATION | Facility: CLINIC | Age: 30
End: 2025-07-22
Payer: COMMERCIAL

## 2025-07-29 DIAGNOSIS — F90.0 ATTENTION DEFICIT HYPERACTIVITY DISORDER (ADHD), PREDOMINANTLY INATTENTIVE TYPE: ICD-10-CM

## 2025-07-29 RX ORDER — DEXTROAMPHETAMINE SACCHARATE, AMPHETAMINE ASPARTATE MONOHYDRATE, DEXTROAMPHETAMINE SULFATE AND AMPHETAMINE SULFATE 2.5; 2.5; 2.5; 2.5 MG/1; MG/1; MG/1; MG/1
10 CAPSULE, EXTENDED RELEASE ORAL EVERY MORNING
Qty: 90 CAPSULE | Refills: 0 | Status: SHIPPED | OUTPATIENT
Start: 2025-07-29

## 2025-07-29 RX ORDER — DEXTROAMPHETAMINE SACCHARATE, AMPHETAMINE ASPARTATE, DEXTROAMPHETAMINE SULFATE AND AMPHETAMINE SULFATE 1.25; 1.25; 1.25; 1.25 MG/1; MG/1; MG/1; MG/1
5 TABLET ORAL
Qty: 90 TABLET | Refills: 0 | Status: SHIPPED | OUTPATIENT
Start: 2025-07-29